# Patient Record
Sex: FEMALE | Race: WHITE | NOT HISPANIC OR LATINO | URBAN - METROPOLITAN AREA
[De-identification: names, ages, dates, MRNs, and addresses within clinical notes are randomized per-mention and may not be internally consistent; named-entity substitution may affect disease eponyms.]

---

## 2022-05-24 ENCOUNTER — OFFICE VISIT (OUTPATIENT)
Dept: OBSTETRICS AND GYNECOLOGY | Facility: CLINIC | Age: 22
End: 2022-05-24
Payer: COMMERCIAL

## 2022-05-24 VITALS
HEIGHT: 67 IN | DIASTOLIC BLOOD PRESSURE: 70 MMHG | SYSTOLIC BLOOD PRESSURE: 114 MMHG | BODY MASS INDEX: 26.44 KG/M2 | WEIGHT: 168.44 LBS

## 2022-05-24 DIAGNOSIS — Z01.419 ROUTINE GYNECOLOGICAL EXAMINATION: Primary | ICD-10-CM

## 2022-05-24 DIAGNOSIS — N93.0 PCB (POST COITAL BLEEDING): ICD-10-CM

## 2022-05-24 PROCEDURE — 1159F MED LIST DOCD IN RCRD: CPT | Mod: CPTII,S$GLB,, | Performed by: OBSTETRICS & GYNECOLOGY

## 2022-05-24 PROCEDURE — 3074F SYST BP LT 130 MM HG: CPT | Mod: CPTII,S$GLB,, | Performed by: OBSTETRICS & GYNECOLOGY

## 2022-05-24 PROCEDURE — 3008F BODY MASS INDEX DOCD: CPT | Mod: CPTII,S$GLB,, | Performed by: OBSTETRICS & GYNECOLOGY

## 2022-05-24 PROCEDURE — 3008F PR BODY MASS INDEX (BMI) DOCUMENTED: ICD-10-PCS | Mod: CPTII,S$GLB,, | Performed by: OBSTETRICS & GYNECOLOGY

## 2022-05-24 PROCEDURE — 1159F PR MEDICATION LIST DOCUMENTED IN MEDICAL RECORD: ICD-10-PCS | Mod: CPTII,S$GLB,, | Performed by: OBSTETRICS & GYNECOLOGY

## 2022-05-24 PROCEDURE — 88175 CYTOPATH C/V AUTO FLUID REDO: CPT | Performed by: OBSTETRICS & GYNECOLOGY

## 2022-05-24 PROCEDURE — 99999 PR PBB SHADOW E&M-NEW PATIENT-LVL III: CPT | Mod: PBBFAC,,, | Performed by: OBSTETRICS & GYNECOLOGY

## 2022-05-24 PROCEDURE — 87491 CHLMYD TRACH DNA AMP PROBE: CPT | Mod: 59 | Performed by: OBSTETRICS & GYNECOLOGY

## 2022-05-24 PROCEDURE — 3078F PR MOST RECENT DIASTOLIC BLOOD PRESSURE < 80 MM HG: ICD-10-PCS | Mod: CPTII,S$GLB,, | Performed by: OBSTETRICS & GYNECOLOGY

## 2022-05-24 PROCEDURE — 87591 N.GONORRHOEAE DNA AMP PROB: CPT | Mod: 59 | Performed by: OBSTETRICS & GYNECOLOGY

## 2022-05-24 PROCEDURE — 87481 CANDIDA DNA AMP PROBE: CPT | Mod: 59 | Performed by: OBSTETRICS & GYNECOLOGY

## 2022-05-24 PROCEDURE — 99999 PR PBB SHADOW E&M-NEW PATIENT-LVL III: ICD-10-PCS | Mod: PBBFAC,,, | Performed by: OBSTETRICS & GYNECOLOGY

## 2022-05-24 PROCEDURE — 3078F DIAST BP <80 MM HG: CPT | Mod: CPTII,S$GLB,, | Performed by: OBSTETRICS & GYNECOLOGY

## 2022-05-24 PROCEDURE — 99385 PR PREVENTIVE VISIT,NEW,18-39: ICD-10-PCS | Mod: S$GLB,,, | Performed by: OBSTETRICS & GYNECOLOGY

## 2022-05-24 PROCEDURE — 3074F PR MOST RECENT SYSTOLIC BLOOD PRESSURE < 130 MM HG: ICD-10-PCS | Mod: CPTII,S$GLB,, | Performed by: OBSTETRICS & GYNECOLOGY

## 2022-05-24 PROCEDURE — 87801 DETECT AGNT MULT DNA AMPLI: CPT | Performed by: OBSTETRICS & GYNECOLOGY

## 2022-05-24 PROCEDURE — 99385 PREV VISIT NEW AGE 18-39: CPT | Mod: S$GLB,,, | Performed by: OBSTETRICS & GYNECOLOGY

## 2022-05-25 LAB
C TRACH DNA SPEC QL NAA+PROBE: NOT DETECTED
N GONORRHOEA DNA SPEC QL NAA+PROBE: NOT DETECTED

## 2022-05-26 LAB
BACTERIAL VAGINOSIS DNA: POSITIVE
CANDIDA GLABRATA DNA: NEGATIVE
CANDIDA KRUSEI DNA: NEGATIVE
CANDIDA RRNA VAG QL PROBE: NEGATIVE
T VAGINALIS RRNA GENITAL QL PROBE: NEGATIVE

## 2022-05-26 RX ORDER — TINIDAZOLE 500 MG/1
TABLET ORAL
Qty: 8 TABLET | Refills: 1 | Status: SHIPPED | OUTPATIENT
Start: 2022-05-26 | End: 2022-07-29 | Stop reason: ALTCHOICE

## 2022-05-27 LAB
FINAL PATHOLOGIC DIAGNOSIS: NORMAL
Lab: NORMAL

## 2022-06-18 ENCOUNTER — OFFICE VISIT (OUTPATIENT)
Dept: URGENT CARE | Facility: CLINIC | Age: 22
End: 2022-06-18
Payer: COMMERCIAL

## 2022-06-18 VITALS
RESPIRATION RATE: 20 BRPM | TEMPERATURE: 98 F | HEART RATE: 78 BPM | WEIGHT: 168 LBS | BODY MASS INDEX: 26.37 KG/M2 | SYSTOLIC BLOOD PRESSURE: 144 MMHG | OXYGEN SATURATION: 98 % | DIASTOLIC BLOOD PRESSURE: 92 MMHG | HEIGHT: 67 IN

## 2022-06-18 DIAGNOSIS — T14.8XXA MUSCLE STRAIN: ICD-10-CM

## 2022-06-18 DIAGNOSIS — M54.9 ACUTE MIDLINE BACK PAIN, UNSPECIFIED BACK LOCATION: Primary | ICD-10-CM

## 2022-06-18 PROCEDURE — 99204 OFFICE O/P NEW MOD 45 MIN: CPT | Mod: S$GLB,,,

## 2022-06-18 PROCEDURE — 1159F PR MEDICATION LIST DOCUMENTED IN MEDICAL RECORD: ICD-10-PCS | Mod: CPTII,S$GLB,,

## 2022-06-18 PROCEDURE — 3080F DIAST BP >= 90 MM HG: CPT | Mod: CPTII,S$GLB,,

## 2022-06-18 PROCEDURE — 1160F PR REVIEW ALL MEDS BY PRESCRIBER/CLIN PHARMACIST DOCUMENTED: ICD-10-PCS | Mod: CPTII,S$GLB,,

## 2022-06-18 PROCEDURE — 3077F PR MOST RECENT SYSTOLIC BLOOD PRESSURE >= 140 MM HG: ICD-10-PCS | Mod: CPTII,S$GLB,,

## 2022-06-18 PROCEDURE — 72070 X-RAY EXAM THORAC SPINE 2VWS: CPT | Mod: FY,S$GLB,, | Performed by: RADIOLOGY

## 2022-06-18 PROCEDURE — 72070 XR THORACIC SPINE AP LATERAL: ICD-10-PCS | Mod: FY,S$GLB,, | Performed by: RADIOLOGY

## 2022-06-18 PROCEDURE — 3008F BODY MASS INDEX DOCD: CPT | Mod: CPTII,S$GLB,,

## 2022-06-18 PROCEDURE — 1160F RVW MEDS BY RX/DR IN RCRD: CPT | Mod: CPTII,S$GLB,,

## 2022-06-18 PROCEDURE — 99204 PR OFFICE/OUTPT VISIT, NEW, LEVL IV, 45-59 MIN: ICD-10-PCS | Mod: S$GLB,,,

## 2022-06-18 PROCEDURE — 3077F SYST BP >= 140 MM HG: CPT | Mod: CPTII,S$GLB,,

## 2022-06-18 PROCEDURE — 3008F PR BODY MASS INDEX (BMI) DOCUMENTED: ICD-10-PCS | Mod: CPTII,S$GLB,,

## 2022-06-18 PROCEDURE — 72040 XR CERVICAL SPINE AP LATERAL: ICD-10-PCS | Mod: FY,S$GLB,, | Performed by: RADIOLOGY

## 2022-06-18 PROCEDURE — 1159F MED LIST DOCD IN RCRD: CPT | Mod: CPTII,S$GLB,,

## 2022-06-18 PROCEDURE — 72040 X-RAY EXAM NECK SPINE 2-3 VW: CPT | Mod: FY,S$GLB,, | Performed by: RADIOLOGY

## 2022-06-18 PROCEDURE — 3080F PR MOST RECENT DIASTOLIC BLOOD PRESSURE >= 90 MM HG: ICD-10-PCS | Mod: CPTII,S$GLB,,

## 2022-06-18 RX ORDER — BUPROPION HYDROCHLORIDE 300 MG/1
300 TABLET ORAL EVERY MORNING
COMMUNITY
Start: 2022-05-18

## 2022-06-18 RX ORDER — NAPROXEN 500 MG/1
500 TABLET ORAL 2 TIMES DAILY WITH MEALS
Qty: 20 TABLET | Refills: 0 | Status: SHIPPED | OUTPATIENT
Start: 2022-06-18 | End: 2022-06-23

## 2022-06-18 RX ORDER — TIZANIDINE 2 MG/1
TABLET ORAL
Qty: 12 TABLET | Refills: 0 | Status: SHIPPED | OUTPATIENT
Start: 2022-06-18 | End: 2022-07-29

## 2022-06-18 RX ORDER — DEXTROAMPHETAMINE SACCHARATE, AMPHETAMINE ASPARTATE MONOHYDRATE, DEXTROAMPHETAMINE SULFATE AND AMPHETAMINE SULFATE 7.5; 7.5; 7.5; 7.5 MG/1; MG/1; MG/1; MG/1
30 CAPSULE, EXTENDED RELEASE ORAL EVERY MORNING
COMMUNITY
Start: 2022-05-18

## 2022-06-18 NOTE — PATIENT INSTRUCTIONS
PLEASE READ ALL DISCHARGE INSTRUCTIONS        Back Pain Discharge Instructions    Alternate heat and ice for first 48 hours then  apply heat. You may do gently stretching if tolerable.    Moist warm compresss to area several times daily.  May use a heating pad on LOW to provide heat over a towel which was dampended with warm water. DO NOT FALL ASLEEP WITH HEATING PAD ON.  Do not stay in one position to long.  When sleeping on your back place a pillow under knees to reduce tension on back.  If sleeping on your side, place pillow between knees to keep spine in better alinement.  Wear supportive shoes such as tennis shoes for support of the lower back.  Take any medication as directed.    If you were not prescribed an anti-inflammatory medication, and if you do not have any history of stomach/intestinal ulcers, or kidney disease, or are not taking a blood thinner such as Coumadin, Plavix, Pradaxa, Eloquis, or Xaralta for example, it is OK to take over the counter Ibuprofen or Advil or Motrin or Aleve as directed.  Do not take these medications on an empty stomach.      [facility] Ref[facility] to [facility]   [facility].    Please call [phone] to schedule your appointment.    Please go to the Emergency Department for any concerns or worsening of condition.  Please follow up with your primary care doctor or specialist in the next 48-72hrs as needed.    If you  smoke, please stop smoking.      If you were prescribed a narcotic medication, do not drive or operate heavy equipment or machinery while taking these medications.    If you lose control of your bowel and/or bladder; lose sensation in between your legs by your genitalia and/or rectum or  lose control or sensation of any extremity, please go to the nearest Emergency Department immediately.    Zaniflex Warning:   The medication you have been prescribed may cause drowsiness and impair your judgement.   Therefore, you should avoid driving, climbing, using machinery, etc., so as not to increase your risk of injury.  Do NOT drink any alcohol while on this medication(s).

## 2022-06-18 NOTE — PROGRESS NOTES
"Subjective:       Patient ID: Rajeev Rodgers is a 21 y.o. female.    Vitals:  height is 5' 7" (1.702 m) and weight is 76.2 kg (168 lb). Her temperature is 98.3 °F (36.8 °C). Her blood pressure is 144/92 (abnormal) and her pulse is 78. Her respiration is 20 and oxygen saturation is 98%.     Chief Complaint: Back Pain (Entered by patient)    Pt presents with complaint of upper back pain x3-4 days.  Pt states her pain is right below her shoulder blade on her left side.  Pt states her pain was 10 out of 10 last night when laying down.  Pt states she is not having trouble breathing but feels discomfort on deep breaths.  Pt states she has tingling going down her left side to her hand.  Pt sttaes she took ibuprofen yesterday for her pain with no relief    Provider note starts below:  Patient presents with chief complaint of back pain for 3-4 days.  She states she is not sure  What brought it on.  She does endorse a history of back pain.  She states that she normally works out and tries different things at the gym.  Last time she was at the gym she did a leg day which included dead lifting and lunges.  She reports the pain was 10/10 last night and started to have tingling in her left arm.  She states the pain is worse when she is resting or lying down.   She also reports feeling the pain when she takes a deep breath. She took couple ibuprofen as yesterday morning and at night but this did not relieve her symptoms.  She denies any numbness, tingling, weakness, fever, chills, cough, chest pain.   She denies any known trauma.    Back Pain  This is a new problem. The current episode started in the past 7 days. The problem occurs constantly. The problem has been gradually worsening since onset. The pain is present in the costovertebral angle. The quality of the pain is described as shooting and aching. The pain does not radiate. The pain is at a severity of 8/10. The pain is severe. The pain is worse during the night. The symptoms " are aggravated by lying down, position and coughing. Stiffness is present all day. Pertinent negatives include no bowel incontinence, chest pain, fever, numbness or weakness. She has tried NSAIDs for the symptoms. The treatment provided no relief.       Constitution: Negative for chills and fever.   Cardiovascular: Negative for chest pain and leg swelling.   Respiratory: Negative for chest tightness, cough, shortness of breath and wheezing.    Gastrointestinal: Negative for bowel incontinence.   Musculoskeletal: Positive for back pain and muscle ache. Negative for pain, trauma, joint pain, joint swelling and abnormal ROM of joint.   Neurological: Positive for tingling. Negative for numbness.       Objective:      Physical Exam   Constitutional: She is oriented to person, place, and time. She appears well-developed. She is cooperative. No distress.   HENT:   Head: Normocephalic and atraumatic.   Nose: Nose normal.   Eyes: Conjunctivae and lids are normal.   Neck: Phonation normal. Neck supple. No edema present. No decreased range of motion present. pain with movement present.   Cardiovascular: Normal rate, regular rhythm, S1 normal, S2 normal, normal heart sounds and normal pulses.   Pulmonary/Chest: Effort normal and breath sounds normal. No tachypnea. She has no decreased breath sounds. She has no wheezes. She has no rhonchi. She has no rales.   Abdominal: Normal appearance.   Musculoskeletal:         General: No deformity.      Cervical back: She exhibits tenderness and bony tenderness. She exhibits no swelling.      Thoracic back: She exhibits tenderness and bony tenderness. She exhibits normal range of motion, no swelling and no deformity.      Lumbar back: She exhibits normal range of motion, no tenderness and no bony tenderness.      Comments: Bony tenderness and paraspinal tenderness from C7 to approximately T12. Pain > in thoracic region; no scapular tenderness; no shoulder joint tenderness; full ROM of  shoulder joint and full flexion/extension of spine; 5/5 strength in BLE; full sensation   Neurological: no focal deficit. She is alert and oriented to person, place, and time. She has normal sensation, normal strength and normal reflexes. No sensory deficit.   Skin: Skin is warm, dry, intact, not diaphoretic and no rash.         Comments: No rash   Psychiatric: Her speech is normal and behavior is normal. Judgment and thought content normal.   Nursing note and vitals reviewed.      X-Ray Cervical Spine AP And Lateral    Result Date: 6/18/2022  EXAMINATION: XR CERVICAL SPINE AP LATERAL CLINICAL HISTORY: Dorsalgia, unspecified TECHNIQUE: AP, lateral and open mouth views of the cervical spine were performed. COMPARISON: None. FINDINGS: Straightening of the normal cervical lordosis.  Vertebral body heights are maintained.  Disc spaces are within normal limits.  Prevertebral soft tissues appear normal.  No fracture or subluxation.     As above. Electronically signed by: Belinda Bautista MD Date:    06/18/2022 Time:    10:46    X-Ray Thoracic Spine AP Lateral    Result Date: 6/18/2022  EXAMINATION: XR THORACIC SPINE AP LATERAL CLINICAL HISTORY: Dorsalgia, unspecified TECHNIQUE: AP and lateral views of the thoracic spine were performed. COMPARISON: None FINDINGS: Minimal dextroscoliotic curvature of the midthoracic spine.  Vertebral body heights are maintained.  Disc spaces are within normal limits.  No fracture or subluxation is seen.     As above. Electronically signed by: Belinda Bautista MD Date:    06/18/2022 Time:    10:45    Assessment:       1. Acute midline back pain, unspecified back location    2. Muscle strain          Plan:     imaging reviewed. Reviewed DDx with patient including muscle strain, fracture, and less likely, pneumonia. Unlikely to be pulmonary related based on physical exam and VSS. Low suspicion for cardiac etiology given lack of risk factors and history not overtly suspicious for cardiac origin.  Discussed treatment for muscle strain as below. Gave ED precautions for any new or worsening symptoms. Will refer to PT for further management as needed. Patient agreed with plan. All questions answered. Patient discharged in NAD.    Acute midline back pain, unspecified back location  -     X-Ray Thoracic Spine AP Lateral; Future; Expected date: 06/18/2022  -     X-Ray Cervical Spine AP And Lateral; Future; Expected date: 06/18/2022  -     naproxen (NAPROSYN) 500 MG tablet; Take 1 tablet (500 mg total) by mouth 2 (two) times daily with meals. for 5 days  Dispense: 20 tablet; Refill: 0  -     Ambulatory referral/consult to Physical/Occupational Therapy    Muscle strain  -     naproxen (NAPROSYN) 500 MG tablet; Take 1 tablet (500 mg total) by mouth 2 (two) times daily with meals. for 5 days  Dispense: 20 tablet; Refill: 0  -     tiZANidine (ZANAFLEX) 2 MG tablet; Take 1-2 tablets every 12 hours  Dispense: 12 tablet; Refill: 0      Patient Instructions   PLEASE READ ALL DISCHARGE INSTRUCTIONS        Back Pain Discharge Instructions    Alternate heat and ice for first 48 hours then  apply heat. You may do gently stretching if tolerable.    Moist warm compresss to area several times daily.  May use a heating pad on LOW to provide heat over a towel which was dampended with warm water. DO NOT FALL ASLEEP WITH HEATING PAD ON.  Do not stay in one position to long.  When sleeping on your back place a pillow under knees to reduce tension on back.  If sleeping on your side, place pillow between knees to keep spine in better alinement.  Wear supportive shoes such as tennis shoes for support of the lower back.  Take any medication as directed.    If you were not prescribed an anti-inflammatory medication, and if you do not have any history of stomach/intestinal ulcers, or kidney disease, or are not taking a blood thinner such as Coumadin, Plavix, Pradaxa, Eloquis, or Xaralta for example, it is OK to take over the counter  Ibuprofen or Advil or Motrin or Aleve as directed.  Do not take these medications on an empty stomach.      [facility] to [facility]   [facility].    Please call [phone] to schedule your appointment.    Please go to the Emergency Department for any concerns or worsening of condition.  Please follow up with your primary care doctor or specialist in the next 48-72hrs as needed.    If you  smoke, please stop smoking.      If you were prescribed a narcotic medication, do not drive or operate heavy equipment or machinery while taking these medications.    If you lose control of your bowel and/or bladder; lose sensation in between your legs by your genitalia and/or rectum or  lose control or sensation of any extremity, please go to the nearest Emergency Department immediately.    Zaniflex Warning:   The medication you have been prescribed may cause drowsiness and impair your judgement.  Therefore, you should avoid driving, climbing, using machinery, etc., so as not to increase your risk of injury.  Do NOT drink any alcohol while on this medication(s).

## 2022-06-24 ENCOUNTER — OFFICE VISIT (OUTPATIENT)
Dept: URGENT CARE | Facility: CLINIC | Age: 22
End: 2022-06-24
Payer: COMMERCIAL

## 2022-06-24 VITALS
BODY MASS INDEX: 26.37 KG/M2 | SYSTOLIC BLOOD PRESSURE: 138 MMHG | OXYGEN SATURATION: 98 % | RESPIRATION RATE: 16 BRPM | HEIGHT: 67 IN | TEMPERATURE: 98 F | HEART RATE: 79 BPM | WEIGHT: 168 LBS | DIASTOLIC BLOOD PRESSURE: 93 MMHG

## 2022-06-24 DIAGNOSIS — J02.9 SORE THROAT: ICD-10-CM

## 2022-06-24 DIAGNOSIS — B34.9 ACUTE VIRAL SYNDROME: Primary | ICD-10-CM

## 2022-06-24 DIAGNOSIS — R11.2 NON-INTRACTABLE VOMITING WITH NAUSEA, UNSPECIFIED VOMITING TYPE: ICD-10-CM

## 2022-06-24 LAB
CTP QC/QA: YES
CTP QC/QA: YES
MOLECULAR STREP A: NEGATIVE
SARS-COV-2 RDRP RESP QL NAA+PROBE: NEGATIVE

## 2022-06-24 PROCEDURE — 3075F PR MOST RECENT SYSTOLIC BLOOD PRESS GE 130-139MM HG: ICD-10-PCS | Mod: CPTII,S$GLB,, | Performed by: NURSE PRACTITIONER

## 2022-06-24 PROCEDURE — 1160F PR REVIEW ALL MEDS BY PRESCRIBER/CLIN PHARMACIST DOCUMENTED: ICD-10-PCS | Mod: CPTII,S$GLB,, | Performed by: NURSE PRACTITIONER

## 2022-06-24 PROCEDURE — 99213 PR OFFICE/OUTPT VISIT, EST, LEVL III, 20-29 MIN: ICD-10-PCS | Mod: S$GLB,,, | Performed by: NURSE PRACTITIONER

## 2022-06-24 PROCEDURE — U0002: ICD-10-PCS | Mod: QW,S$GLB,, | Performed by: NURSE PRACTITIONER

## 2022-06-24 PROCEDURE — 3008F BODY MASS INDEX DOCD: CPT | Mod: CPTII,S$GLB,, | Performed by: NURSE PRACTITIONER

## 2022-06-24 PROCEDURE — 87651 POCT STREP A MOLECULAR: ICD-10-PCS | Mod: QW,S$GLB,, | Performed by: NURSE PRACTITIONER

## 2022-06-24 PROCEDURE — 99213 OFFICE O/P EST LOW 20 MIN: CPT | Mod: S$GLB,,, | Performed by: NURSE PRACTITIONER

## 2022-06-24 PROCEDURE — U0002 COVID-19 LAB TEST NON-CDC: HCPCS | Mod: QW,S$GLB,, | Performed by: NURSE PRACTITIONER

## 2022-06-24 PROCEDURE — 87651 STREP A DNA AMP PROBE: CPT | Mod: QW,S$GLB,, | Performed by: NURSE PRACTITIONER

## 2022-06-24 PROCEDURE — 1159F PR MEDICATION LIST DOCUMENTED IN MEDICAL RECORD: ICD-10-PCS | Mod: CPTII,S$GLB,, | Performed by: NURSE PRACTITIONER

## 2022-06-24 PROCEDURE — 3080F PR MOST RECENT DIASTOLIC BLOOD PRESSURE >= 90 MM HG: ICD-10-PCS | Mod: CPTII,S$GLB,, | Performed by: NURSE PRACTITIONER

## 2022-06-24 PROCEDURE — 3075F SYST BP GE 130 - 139MM HG: CPT | Mod: CPTII,S$GLB,, | Performed by: NURSE PRACTITIONER

## 2022-06-24 PROCEDURE — 3080F DIAST BP >= 90 MM HG: CPT | Mod: CPTII,S$GLB,, | Performed by: NURSE PRACTITIONER

## 2022-06-24 PROCEDURE — 1159F MED LIST DOCD IN RCRD: CPT | Mod: CPTII,S$GLB,, | Performed by: NURSE PRACTITIONER

## 2022-06-24 PROCEDURE — 1160F RVW MEDS BY RX/DR IN RCRD: CPT | Mod: CPTII,S$GLB,, | Performed by: NURSE PRACTITIONER

## 2022-06-24 PROCEDURE — 3008F PR BODY MASS INDEX (BMI) DOCUMENTED: ICD-10-PCS | Mod: CPTII,S$GLB,, | Performed by: NURSE PRACTITIONER

## 2022-06-24 RX ORDER — ONDANSETRON 4 MG/1
TABLET, ORALLY DISINTEGRATING ORAL
Qty: 10 TABLET | Refills: 0 | Status: SHIPPED | OUTPATIENT
Start: 2022-06-24 | End: 2024-01-22

## 2022-06-24 NOTE — PATIENT INSTRUCTIONS
If your condition worsens or fails to improve we recommend that you receive another evaluation at the ER immediately or contact your PCP to discuss your concerns or return here. You must understand that you've received an urgent care treatment only and that you may be released before all your medical problems are known or treated. You the patient will arrange for followup care as instructed.   If the strept culture was done and returns negative in 3-5 days and you are still having a sore throat, you may need to get a mono spot test done or repeated.   Tylenol or ibuprofen for pain may help as long as you are not allergic to these meds or have a medical condition such as stomach ulcers, liver or kidney disease or taking blood thinners etc that would   prevent you from using these medications.   Rest and fluids will help as well.

## 2022-06-24 NOTE — PROGRESS NOTES
"Subjective:       Patient ID: Rajeev Rodgers is a 21 y.o. female.    Vitals:  height is 5' 7" (1.702 m) and weight is 76.2 kg (168 lb). Her temporal temperature is 98 °F (36.7 °C). Her blood pressure is 138/93 (abnormal) and her pulse is 79. Her respiration is 16 and oxygen saturation is 98%.     Chief Complaint: Sore Throat    Patient presents with c/o nausea vomiting diarrhea on Tuesday.  These symptoms have resolved.  Now with fatigue with a scratchy throat starting yesterday.  Denies fever.  Denies cough, congestion.      Sore Throat   This is a new problem. The current episode started in the past 7 days. The problem has been gradually worsening. There has been no fever. The pain is at a severity of 3/10. The pain is mild. Associated symptoms include abdominal pain (generalized soreness), diarrhea and vomiting. Pertinent negatives include no congestion, coughing, drooling, ear discharge, ear pain, headaches, hoarse voice, plugged ear sensation, neck pain, shortness of breath, stridor, swollen glands or trouble swallowing. She has had exposure to strep. She has had no exposure to mono. Exposure to: possible. She has tried nothing for the symptoms.       Constitution: Positive for fatigue. Negative for chills, sweating and fever.   HENT: Positive for sore throat. Negative for ear pain, ear discharge, drooling, congestion and trouble swallowing.    Neck: Negative for neck pain.   Respiratory: Negative for cough, shortness of breath and stridor.    Gastrointestinal: Positive for abdominal pain (generalized soreness), vomiting and diarrhea. Negative for abdominal bloating.   Neurological: Negative for headaches.       Objective:      Physical Exam   Constitutional: She is oriented to person, place, and time. She appears well-developed. She is cooperative.  Non-toxic appearance. She does not appear ill. No distress.   HENT:   Head: Normocephalic and atraumatic.   Ears:   Right Ear: Hearing, tympanic membrane, external " ear and ear canal normal.   Left Ear: Hearing, tympanic membrane, external ear and ear canal normal.   Nose: Nose normal. No mucosal edema, rhinorrhea or nasal deformity. No epistaxis. Right sinus exhibits no maxillary sinus tenderness and no frontal sinus tenderness. Left sinus exhibits no maxillary sinus tenderness and no frontal sinus tenderness.   Mouth/Throat: Uvula is midline, oropharynx is clear and moist and mucous membranes are normal. No trismus in the jaw. Normal dentition. No uvula swelling. No oropharyngeal exudate, posterior oropharyngeal edema or posterior oropharyngeal erythema.   Eyes: Conjunctivae and lids are normal. No scleral icterus.   Neck: Trachea normal and phonation normal. Neck supple. No edema present. No erythema present. No neck rigidity present.   Cardiovascular: Normal rate, regular rhythm, normal heart sounds and normal pulses.   Pulmonary/Chest: Effort normal and breath sounds normal. No respiratory distress. She has no decreased breath sounds. She has no rhonchi.   Abdominal: Normal appearance and bowel sounds are normal. She exhibits no distension, no abdominal bruit, no pulsatile midline mass and no mass. Soft. There is no abdominal tenderness. There is no rebound, no guarding, no left CVA tenderness and no right CVA tenderness.   Musculoskeletal: Normal range of motion.         General: No deformity. Normal range of motion.   Neurological: She is alert and oriented to person, place, and time. She has normal strength. She exhibits normal muscle tone. Coordination normal.   Skin: Skin is warm, dry, intact, not diaphoretic and not pale.   Psychiatric: Her speech is normal and behavior is normal. Judgment and thought content normal.   Nursing note and vitals reviewed.    Results for orders placed or performed in visit on 06/24/22   POCT Strep A, Molecular   Result Value Ref Range    Molecular Strep A, POC Negative Negative     Acceptable Yes    POCT COVID-19 Rapid  Screening   Result Value Ref Range    POC Rapid COVID Negative Negative     Acceptable Yes          Assessment:       1. Acute viral syndrome    2. Sore throat    3. Non-intractable vomiting with nausea, unspecified vomiting type          Plan:       Lab reviewed  Acute viral syndrome    Sore throat  -     POCT Strep A, Molecular  -     POCT COVID-19 Rapid Screening    Non-intractable vomiting with nausea, unspecified vomiting type  -     ondansetron (ZOFRAN-ODT) 4 MG TbDL; One tablet sublingual tid prn nausea  Dispense: 10 tablet; Refill: 0      Patient Instructions     If your condition worsens or fails to improve we recommend that you receive another evaluation at the ER immediately or contact your PCP to discuss your concerns or return here. You must understand that you've received an urgent care treatment only and that you may be released before all your medical problems are known or treated. You the patient will arrange for followup care as instructed.   If the strept culture was done and returns negative in 3-5 days and you are still having a sore throat, you may need to get a mono spot test done or repeated.   Tylenol or ibuprofen for pain may help as long as you are not allergic to these meds or have a medical condition such as stomach ulcers, liver or kidney disease or taking blood thinners etc that would   prevent you from using these medications.   Rest and fluids will help as well.

## 2022-07-29 ENCOUNTER — LAB VISIT (OUTPATIENT)
Dept: LAB | Facility: HOSPITAL | Age: 22
End: 2022-07-29
Attending: FAMILY MEDICINE
Payer: COMMERCIAL

## 2022-07-29 ENCOUNTER — OFFICE VISIT (OUTPATIENT)
Dept: PRIMARY CARE CLINIC | Facility: CLINIC | Age: 22
End: 2022-07-29
Payer: COMMERCIAL

## 2022-07-29 VITALS
OXYGEN SATURATION: 98 % | HEIGHT: 67 IN | SYSTOLIC BLOOD PRESSURE: 130 MMHG | DIASTOLIC BLOOD PRESSURE: 78 MMHG | HEART RATE: 99 BPM | WEIGHT: 160.94 LBS | BODY MASS INDEX: 25.26 KG/M2

## 2022-07-29 DIAGNOSIS — Z13.6 ENCOUNTER FOR LIPID SCREENING FOR CARDIOVASCULAR DISEASE: ICD-10-CM

## 2022-07-29 DIAGNOSIS — Z11.3 ROUTINE SCREENING FOR STI (SEXUALLY TRANSMITTED INFECTION): ICD-10-CM

## 2022-07-29 DIAGNOSIS — Z13.220 ENCOUNTER FOR LIPID SCREENING FOR CARDIOVASCULAR DISEASE: ICD-10-CM

## 2022-07-29 DIAGNOSIS — F32.81 PMDD (PREMENSTRUAL DYSPHORIC DISORDER): ICD-10-CM

## 2022-07-29 DIAGNOSIS — Z00.00 GENERAL MEDICAL EXAM: Primary | ICD-10-CM

## 2022-07-29 DIAGNOSIS — Z11.4 ENCOUNTER FOR SCREENING FOR HIV: ICD-10-CM

## 2022-07-29 DIAGNOSIS — Z11.59 NEED FOR HEPATITIS C SCREENING TEST: ICD-10-CM

## 2022-07-29 DIAGNOSIS — Z00.00 GENERAL MEDICAL EXAM: ICD-10-CM

## 2022-07-29 LAB
ALBUMIN SERPL BCP-MCNC: 4.9 G/DL (ref 3.5–5.2)
ALP SERPL-CCNC: 62 U/L (ref 55–135)
ALT SERPL W/O P-5'-P-CCNC: 18 U/L (ref 10–44)
ANION GAP SERPL CALC-SCNC: 9 MMOL/L (ref 8–16)
AST SERPL-CCNC: 17 U/L (ref 10–40)
BASOPHILS # BLD AUTO: 0.02 K/UL (ref 0–0.2)
BASOPHILS NFR BLD: 0.6 % (ref 0–1.9)
BILIRUB SERPL-MCNC: 0.6 MG/DL (ref 0.1–1)
BUN SERPL-MCNC: 10 MG/DL (ref 6–20)
CALCIUM SERPL-MCNC: 10.6 MG/DL (ref 8.7–10.5)
CHLORIDE SERPL-SCNC: 102 MMOL/L (ref 95–110)
CHOLEST SERPL-MCNC: 148 MG/DL (ref 120–199)
CHOLEST/HDLC SERPL: 3.1 {RATIO} (ref 2–5)
CO2 SERPL-SCNC: 27 MMOL/L (ref 23–29)
CREAT SERPL-MCNC: 0.8 MG/DL (ref 0.5–1.4)
DIFFERENTIAL METHOD: ABNORMAL
EOSINOPHIL # BLD AUTO: 0.2 K/UL (ref 0–0.5)
EOSINOPHIL NFR BLD: 6.1 % (ref 0–8)
ERYTHROCYTE [DISTWIDTH] IN BLOOD BY AUTOMATED COUNT: 11.8 % (ref 11.5–14.5)
EST. GFR  (AFRICAN AMERICAN): >60 ML/MIN/1.73 M^2
EST. GFR  (NON AFRICAN AMERICAN): >60 ML/MIN/1.73 M^2
GLUCOSE SERPL-MCNC: 84 MG/DL (ref 70–110)
HCT VFR BLD AUTO: 44.3 % (ref 37–48.5)
HDLC SERPL-MCNC: 48 MG/DL (ref 40–75)
HDLC SERPL: 32.4 % (ref 20–50)
HGB BLD-MCNC: 15.2 G/DL (ref 12–16)
IMM GRANULOCYTES # BLD AUTO: 0 K/UL (ref 0–0.04)
IMM GRANULOCYTES NFR BLD AUTO: 0 % (ref 0–0.5)
LDLC SERPL CALC-MCNC: 87.8 MG/DL (ref 63–159)
LYMPHOCYTES # BLD AUTO: 1.7 K/UL (ref 1–4.8)
LYMPHOCYTES NFR BLD: 46.9 % (ref 18–48)
MCH RBC QN AUTO: 30.9 PG (ref 27–31)
MCHC RBC AUTO-ENTMCNC: 34.3 G/DL (ref 32–36)
MCV RBC AUTO: 90 FL (ref 82–98)
MONOCYTES # BLD AUTO: 0.3 K/UL (ref 0.3–1)
MONOCYTES NFR BLD: 7.8 % (ref 4–15)
NEUTROPHILS # BLD AUTO: 1.4 K/UL (ref 1.8–7.7)
NEUTROPHILS NFR BLD: 38.6 % (ref 38–73)
NONHDLC SERPL-MCNC: 100 MG/DL
NRBC BLD-RTO: 0 /100 WBC
PLATELET # BLD AUTO: 318 K/UL (ref 150–450)
PMV BLD AUTO: 10.7 FL (ref 9.2–12.9)
POTASSIUM SERPL-SCNC: 4.1 MMOL/L (ref 3.5–5.1)
PROT SERPL-MCNC: 7.8 G/DL (ref 6–8.4)
RBC # BLD AUTO: 4.92 M/UL (ref 4–5.4)
SODIUM SERPL-SCNC: 138 MMOL/L (ref 136–145)
TRIGL SERPL-MCNC: 61 MG/DL (ref 30–150)
TSH SERPL DL<=0.005 MIU/L-ACNC: 0.87 UIU/ML (ref 0.4–4)
WBC # BLD AUTO: 3.6 K/UL (ref 3.9–12.7)

## 2022-07-29 PROCEDURE — 99385 PREV VISIT NEW AGE 18-39: CPT | Mod: 25,1D,GY,S$GLB | Performed by: FAMILY MEDICINE

## 2022-07-29 PROCEDURE — 3078F PR MOST RECENT DIASTOLIC BLOOD PRESSURE < 80 MM HG: ICD-10-PCS | Mod: CPTII,S$GLB,, | Performed by: FAMILY MEDICINE

## 2022-07-29 PROCEDURE — 87389 HIV-1 AG W/HIV-1&-2 AB AG IA: CPT | Performed by: FAMILY MEDICINE

## 2022-07-29 PROCEDURE — 1160F PR REVIEW ALL MEDS BY PRESCRIBER/CLIN PHARMACIST DOCUMENTED: ICD-10-PCS | Mod: CPTII,S$GLB,, | Performed by: FAMILY MEDICINE

## 2022-07-29 PROCEDURE — 80053 COMPREHEN METABOLIC PANEL: CPT | Performed by: FAMILY MEDICINE

## 2022-07-29 PROCEDURE — 36415 COLL VENOUS BLD VENIPUNCTURE: CPT | Mod: PN | Performed by: FAMILY MEDICINE

## 2022-07-29 PROCEDURE — 86592 SYPHILIS TEST NON-TREP QUAL: CPT | Performed by: FAMILY MEDICINE

## 2022-07-29 PROCEDURE — 3008F PR BODY MASS INDEX (BMI) DOCUMENTED: ICD-10-PCS | Mod: CPTII,S$GLB,, | Performed by: FAMILY MEDICINE

## 2022-07-29 PROCEDURE — 80061 LIPID PANEL: CPT | Performed by: FAMILY MEDICINE

## 2022-07-29 PROCEDURE — 3078F DIAST BP <80 MM HG: CPT | Mod: CPTII,S$GLB,, | Performed by: FAMILY MEDICINE

## 2022-07-29 PROCEDURE — 1159F MED LIST DOCD IN RCRD: CPT | Mod: CPTII,S$GLB,, | Performed by: FAMILY MEDICINE

## 2022-07-29 PROCEDURE — 86803 HEPATITIS C AB TEST: CPT | Performed by: FAMILY MEDICINE

## 2022-07-29 PROCEDURE — 1160F RVW MEDS BY RX/DR IN RCRD: CPT | Mod: CPTII,S$GLB,, | Performed by: FAMILY MEDICINE

## 2022-07-29 PROCEDURE — 99202 PR OFFICE/OUTPT VISIT, NEW, LEVL II, 15-29 MIN: ICD-10-PCS | Mod: 25,S$GLB,, | Performed by: FAMILY MEDICINE

## 2022-07-29 PROCEDURE — 3075F SYST BP GE 130 - 139MM HG: CPT | Mod: CPTII,S$GLB,, | Performed by: FAMILY MEDICINE

## 2022-07-29 PROCEDURE — 1159F PR MEDICATION LIST DOCUMENTED IN MEDICAL RECORD: ICD-10-PCS | Mod: CPTII,S$GLB,, | Performed by: FAMILY MEDICINE

## 2022-07-29 PROCEDURE — 85025 COMPLETE CBC W/AUTO DIFF WBC: CPT | Performed by: FAMILY MEDICINE

## 2022-07-29 PROCEDURE — 3075F PR MOST RECENT SYSTOLIC BLOOD PRESS GE 130-139MM HG: ICD-10-PCS | Mod: CPTII,S$GLB,, | Performed by: FAMILY MEDICINE

## 2022-07-29 PROCEDURE — 84443 ASSAY THYROID STIM HORMONE: CPT | Performed by: FAMILY MEDICINE

## 2022-07-29 PROCEDURE — 99202 OFFICE O/P NEW SF 15 MIN: CPT | Mod: 25,S$GLB,, | Performed by: FAMILY MEDICINE

## 2022-07-29 PROCEDURE — 99999 PR PBB SHADOW E&M-EST. PATIENT-LVL III: ICD-10-PCS | Mod: PBBFAC,,, | Performed by: FAMILY MEDICINE

## 2022-07-29 PROCEDURE — 3008F BODY MASS INDEX DOCD: CPT | Mod: CPTII,S$GLB,, | Performed by: FAMILY MEDICINE

## 2022-07-29 PROCEDURE — 99999 PR PBB SHADOW E&M-EST. PATIENT-LVL III: CPT | Mod: PBBFAC,,, | Performed by: FAMILY MEDICINE

## 2022-07-29 PROCEDURE — 99385 PR PREVENTIVE VISIT,NEW,18-39: ICD-10-PCS | Mod: 25,1D,GY,S$GLB | Performed by: FAMILY MEDICINE

## 2022-07-29 RX ORDER — DEXTROAMPHETAMINE SACCHARATE, AMPHETAMINE ASPARTATE MONOHYDRATE, DEXTROAMPHETAMINE SULFATE AND AMPHETAMINE SULFATE 5; 5; 5; 5 MG/1; MG/1; MG/1; MG/1
CAPSULE, EXTENDED RELEASE ORAL
COMMUNITY
Start: 2021-10-01 | End: 2022-07-29 | Stop reason: SDUPTHER

## 2022-07-29 RX ORDER — BUPROPION HYDROCHLORIDE 300 MG/1
TABLET ORAL
COMMUNITY
Start: 2021-11-15 | End: 2022-07-29 | Stop reason: SDUPTHER

## 2022-07-29 RX ORDER — MELOXICAM 15 MG/1
15 TABLET ORAL DAILY PRN
Qty: 30 TABLET | Refills: 2 | Status: SHIPPED | OUTPATIENT
Start: 2022-07-29 | End: 2024-01-22

## 2022-07-30 LAB — RPR SER QL: NORMAL

## 2022-07-31 NOTE — PROGRESS NOTES
Subjective:       Patient ID: Rajeev Rodgers is a 21 y.o. female.    Chief Complaint: Annual Exam    HPI   21 year old female comes in for annual exam. Reports history of depression and ADHD.    Review of Systems   Constitutional: Negative for unexpected weight change.   HENT: Negative for ear pain and sore throat.    Eyes: Negative for visual disturbance.   Respiratory: Negative for shortness of breath.    Cardiovascular: Negative for chest pain.   Gastrointestinal: Negative for abdominal pain and blood in stool.   Endocrine: Negative for cold intolerance and heat intolerance.   Genitourinary: Negative for dysuria and frequency.   Integumentary:  Negative for rash.   Neurological: Negative for weakness, numbness and headaches.   Hematological: Negative for adenopathy.         Objective:      Physical Exam  Vitals reviewed.   Constitutional:       General: She is not in acute distress.  HENT:      Head: Normocephalic and atraumatic.      Right Ear: Ear canal and external ear normal.      Left Ear: Ear canal and external ear normal.      Nose: Nose normal.      Mouth/Throat:      Mouth: Mucous membranes are moist.      Pharynx: No oropharyngeal exudate or posterior oropharyngeal erythema.   Eyes:      Extraocular Movements: Extraocular movements intact.      Conjunctiva/sclera: Conjunctivae normal.      Pupils: Pupils are equal, round, and reactive to light.   Cardiovascular:      Rate and Rhythm: Normal rate and regular rhythm.      Pulses: Normal pulses.      Heart sounds: Normal heart sounds.   Pulmonary:      Effort: Pulmonary effort is normal. No respiratory distress.      Breath sounds: No wheezing or rales.   Musculoskeletal:      Cervical back: Normal range of motion. No rigidity or tenderness.   Lymphadenopathy:      Cervical: No cervical adenopathy.   Skin:     General: Skin is warm.      Capillary Refill: Capillary refill takes less than 2 seconds.   Neurological:      Mental Status: She is alert and  oriented to person, place, and time.      Cranial Nerves: No cranial nerve deficit.      Sensory: No sensory deficit.         Assessment:       Problem List Items Addressed This Visit    None     Visit Diagnoses     General medical exam    -  Primary    Relevant Orders    Comprehensive Metabolic Panel (Completed)    CBC Auto Differential (Completed)    Lipid Panel (Completed)    Urinalysis (Completed)    Encounter for lipid screening for cardiovascular disease        Relevant Orders    Lipid Panel (Completed)    Encounter for screening for HIV        Relevant Orders    HIV 1/2 Ag/Ab (4th Gen)    Need for hepatitis C screening test        Relevant Orders    Hepatitis C Antibody    Routine screening for STI (sexually transmitted infection)        Relevant Orders    C. trachomatis/N. gonorrhoeae by AMP DNA [facility]; Urine    RPR (Completed)    PMDD (premenstrual dysphoric disorder)        Relevant Medications    meloxicam (MOBIC) 15 MG tablet    Other Relevant Orders    TSH (Completed)          Plan:       Rajeev was seen today for annual exam.    Diagnoses and all orders for this visit:    General medical exam  -     Comprehensive Metabolic Panel; Future  -     CBC Auto Differential; Future  -     Lipid Panel; Future  -     Urinalysis; Future  Age appropriate recommendations reviewed.  Screening labs ordered.    Encounter for lipid screening for cardiovascular disease  -     Lipid Panel; Future  Screening lipid panel ordered.    Encounter for screening for HIV  -     HIV 1/2 Ag/Ab (4th Gen); Future  Screen for HIV as per USPSTF guidelines    Need for hepatitis C screening test  -     Hepatitis C Antibody; Future  Screen for HCV as per USPSTF guidelines    Routine screening for STI (sexually transmitted infection)  -     C. trachomatis/N. gonorrhoeae by AMP DNA [facility]; Urine; Future  -     RPR; Future  Screen for STI          ____________________________________________________________    CC: Menstrual  problem    HPI  Patient reports that her periods are regular but does get severe cramping and she becomes extremely irritable, and depressed. She admits to thoughts of death at times. Has never formulated any plans to hurt herself. Symptoms last for over a week. Has an IUD.     Review of Systems   Genitourinary: Positive for menstrual problem. Negative for dysuria and frequency.   Psychiatric/Behavioral: Positive for agitation, decreased concentration, dysphoric mood and sleep disturbance.        Psychiatric symptoms with period      Physical Exam  Abdominal:      General: Abdomen is flat. Bowel sounds are normal. There is no distension.      Palpations: Abdomen is soft.      Tenderness: There is no abdominal tenderness. There is no guarding.   Psychiatric:         Mood and Affect: Mood normal.         Behavior: Behavior normal.     Assessment/Planb  PMDD (premenstrual dysphoric disorder)  -     TSH; Future  -     meloxicam (MOBIC) 15 MG tablet; Take 1 tablet (15 mg total) by mouth daily as needed (PMDD symptoms).  Discussed management options.  Will try Mobic for when starts having pain  If not helping, consider adding Zoloft/Celexa to use during luteal phase of period.

## 2022-08-01 LAB
HCV AB SERPL QL IA: NEGATIVE
HIV 1+2 AB+HIV1 P24 AG SERPL QL IA: NEGATIVE

## 2022-08-04 ENCOUNTER — OFFICE VISIT (OUTPATIENT)
Dept: PRIMARY CARE CLINIC | Facility: CLINIC | Age: 22
End: 2022-08-04
Payer: COMMERCIAL

## 2022-08-04 DIAGNOSIS — N76.0 VAGINOSIS: Primary | ICD-10-CM

## 2022-08-04 PROCEDURE — 99212 PR OFFICE/OUTPT VISIT, EST, LEVL II, 10-19 MIN: ICD-10-PCS | Mod: 95,,, | Performed by: NURSE PRACTITIONER

## 2022-08-04 PROCEDURE — 1159F PR MEDICATION LIST DOCUMENTED IN MEDICAL RECORD: ICD-10-PCS | Mod: CPTII,95,, | Performed by: NURSE PRACTITIONER

## 2022-08-04 PROCEDURE — 1160F RVW MEDS BY RX/DR IN RCRD: CPT | Mod: CPTII,95,, | Performed by: NURSE PRACTITIONER

## 2022-08-04 PROCEDURE — 99212 OFFICE O/P EST SF 10 MIN: CPT | Mod: 95,,, | Performed by: NURSE PRACTITIONER

## 2022-08-04 PROCEDURE — 1159F MED LIST DOCD IN RCRD: CPT | Mod: CPTII,95,, | Performed by: NURSE PRACTITIONER

## 2022-08-04 PROCEDURE — 1160F PR REVIEW ALL MEDS BY PRESCRIBER/CLIN PHARMACIST DOCUMENTED: ICD-10-PCS | Mod: CPTII,95,, | Performed by: NURSE PRACTITIONER

## 2022-08-04 RX ORDER — FLUCONAZOLE 150 MG/1
150 TABLET ORAL DAILY
Qty: 2 TABLET | Refills: 0 | Status: SHIPPED | OUTPATIENT
Start: 2022-08-04 | End: 2022-08-06

## 2022-08-04 NOTE — PROGRESS NOTES
[facility] Note    Chief Complaint    vaginosis    History of Present Illness      Rajeev Rodgers is a 21 y.o. female who presents today for vaginosis. Symptoms of vaginal discomfort started yesterday.  She denies any SOB, chest pain, N/V, unintentional weight loss, loss of appetite, fatigue, diarrhea, constipation. She is active daily and remains independent with ADL's.     Problem List Items Addressed This Visit    None     Visit Diagnoses     Vaginosis    -  Primary    Relevant Medications    fluconazole (DIFLUCAN) 150 MG Tab          Review of Systems   Constitutional: Negative.    HENT: Negative.    Eyes: Negative.    Respiratory: Negative.    Cardiovascular: Negative.    Gastrointestinal: Negative.    Musculoskeletal: Negative.    Skin: Negative.    Neurological: Negative.    Endo/Heme/Allergies: Negative.    Psychiatric/Behavioral: Negative.         Past Medical History:  Past Medical History:   Diagnosis Date    ADHD     Depression        Past Surgical History:  Past Surgical History:   Procedure Laterality Date    INTRAUTERINE DEVICE INSERTION  2020    KYLEENA       Family History:  family history includes Hypertension in her father.   Family history was reviewed with patient.    Social History:  Social History     Socioeconomic History    Marital status: Single   Tobacco Use    Smoking status: Never Smoker    Smokeless tobacco: Never Used   Substance and Sexual Activity    Alcohol use: Yes    Drug use: Not Currently    Sexual activity: Yes     Partners: Male         Medications:  Outpatient Encounter Medications as of 8/4/2022   Medication Sig Dispense Refill    buPROPion (WELLBUTRIN XL) 300 MG 24 hr tablet Take 300 mg by mouth every morning.      dextroamphetamine-amphetamine (ADDERALL XR) 30 MG 24 hr capsule Take 30 mg by mouth every morning.      fluconazole (DIFLUCAN) 150 MG Tab Take 1 tablet (150 mg total) by mouth once daily. for 2 doses 2 tablet 0    meloxicam (MOBIC) 15  MG tablet Take 1 tablet (15 mg total) by mouth daily as needed (PMDD symptoms). 30 tablet 2    ondansetron (ZOFRAN-ODT) 4 MG TbDL One tablet sublingual tid prn nausea 10 tablet 0     No facility-administered encounter medications on file as of 8/4/2022.       Allergies:  Review of patient's allergies indicates:  No Known Allergies    Health Maintenance:  Health Maintenance   Topic Date Due    HPV Vaccines (1 - 2-dose series) Never done    Chlamydia Screening  07/29/2023    Pap Smear  05/24/2025    TETANUS VACCINE  07/23/2031    Hepatitis C Screening  Completed    Lipid Panel  Completed     Health Maintenance Topics with due status: Not Due       Topic Last Completion Date    TETANUS VACCINE 07/23/2021    Pap Smear 05/24/2022    Chlamydia Screening 07/29/2022    Influenza Vaccine Not Due       Physical Exam       ]    Laboratory:  CBC:  Recent Labs   Lab 07/29/22  1016   WBC 3.60 L   RBC 4.92   Hemoglobin 15.2   Hematocrit 44.3   Platelets 318   MCV 90   MCH 30.9   MCHC 34.3     CMP:  Recent Labs   Lab 07/29/22  1016   Glucose 84   Calcium 10.6 H   Albumin 4.9   Total Protein 7.8   Sodium 138   Potassium 4.1   CO2 27   Chloride 102   BUN 10   Alkaline Phosphatase 62   ALT 18   AST 17   Total Bilirubin 0.6     URINALYSIS:  Recent Labs   Lab 07/29/22  1017   Color, UA Straw   Specific [place], UA 1.005   pH, UA 8.0   Protein, UA Negative   Nitrite, UA Negative   Leukocytes, UA Negative      LIPIDS:  Recent Labs   Lab 07/29/22  1016   TSH 0.866   HDL 48   Cholesterol 148   Triglycerides 61   LDL Cholesterol 87.8   HDL/Cholesterol Ratio 32.4   Non-HDL Cholesterol 100   Total Cholesterol/HDL Ratio 3.1     TSH:  Recent Labs   Lab 07/29/22  1016   TSH 0.866     A1C:        Radiology:        Assessment/Plan     Rajeev Rodgers is a 21 y.o.female with:    Vaginosis  -     fluconazole (DIFLUCAN) 150 MG Tab; Take 1 tablet (150 mg total) by mouth once daily. for 2 doses  Dispense: 2 tablet; Refill: 0    - Avoid bubble  baths  - Avoid sexual intercourse for 1 week  - Avoid vaginal douching.  - Wipe front to back after voiding  - Avoid tight clothing (pants, shorts, underwear) until vaginosis resolved  - Increase oral water intake    As above, continue current medications and maintain follow up with specialists.  Return to clinic as needed.    I spent 11 minutes on the day of this virtual encounter for preparing, evaluating, treating, and discussing plan of care with this patient.  Greater than 50% of this time was spent face to face via virtual visit with patient.  All questions were answered to patient's satisfaction.    Answers for HPI/ROS submitted by the patient on 8/4/2022  activity change: No  unexpected weight change: No  neck pain: No  hearing loss: No  rhinorrhea: No  trouble swallowing: No  eye discharge: No  visual disturbance: No  chest tightness: No  wheezing: No  chest pain: No  palpitations: No  blood in stool: No  constipation: No  vomiting: No  diarrhea: No  polydipsia: No  polyuria: No  difficulty urinating: No  hematuria: No  menstrual problem: Yes  dysuria: No  joint swelling: No  arthralgias: No  headaches: No  weakness: No  confusion: No  dysphoric mood: No        Karen L Spencer, NP-C [facility]

## 2022-08-08 NOTE — PROGRESS NOTES
[facility] Note    Chief Complaint    F/U visit    History of Present Illness      Rajeev Rodgers is a 21 y.o. female who presents today for f/u visit for possible UTI. She reports feeling some discomfort upon urination. She is concerned that a new medication, Meloxicam, prescribed for her PDD, has caused her to have a UTI. She thought this was prescribed to her to alleviate her depression during her menstrual cycle. She sees a psychiatrist for depression. She has an appointment next week to see her psychiatrist and will discuss further treatment for her heavier depression during her menstrual cycle. She denies suicide ideation.   She has an IUD and does not get  menstrual bleeding but states she gets all of the side effects of cramps, depression, bloating. Otherwise, patient is feeling well.   She denies any SOB, chest pain, N/V, unintentional weight loss, loss of appetite, fatigue, diarrhea, constipation. She is active daily and remains independent with ADL's.    Problem List Items Addressed This Visit        Psychiatric    Depression       Renal/    UTI (urinary tract infection) - Primary    Relevant Medications    nitrofurantoin, macrocrystal-monohydrate, (MACROBID) 100 MG capsule    Other Relevant Orders    Urinalysis, Reflex to Urine Culture Urine, Clean Catch          Review of Systems   Constitutional: Negative.    HENT: Negative.    Eyes: Negative.    Respiratory: Negative.    Cardiovascular: Negative.    Gastrointestinal: Negative.    Genitourinary: Positive for urgency.   Skin: Negative.    Neurological: Negative.    Endo/Heme/Allergies: Negative.    Psychiatric/Behavioral: Negative.         Past Medical History:  Past Medical History:   Diagnosis Date    ADHD     Depression        Past Surgical History:  Past Surgical History:   Procedure Laterality Date    INTRAUTERINE DEVICE INSERTION  2020    KYLEENA       Family History:  family history includes Hypertension in her father.  "  Family history was reviewed with patient.    Social History:  Social History     Socioeconomic History    Marital status: Single   Tobacco Use    Smoking status: Never Smoker    Smokeless tobacco: Never Used   Substance and Sexual Activity    Alcohol use: Yes    Drug use: Not Currently    Sexual activity: Yes     Partners: Male         Medications:  Outpatient Encounter Medications as of 8/12/2022   Medication Sig Dispense Refill    buPROPion (WELLBUTRIN XL) 300 MG 24 hr tablet Take 300 mg by mouth every morning.      dextroamphetamine-amphetamine (ADDERALL XR) 30 MG 24 hr capsule Take 30 mg by mouth every morning.      meloxicam (MOBIC) 15 MG tablet Take 1 tablet (15 mg total) by mouth daily as needed (PMDD symptoms). 30 tablet 2    ondansetron (ZOFRAN-ODT) 4 MG TbDL One tablet sublingual tid prn nausea 10 tablet 0    nitrofurantoin, macrocrystal-monohydrate, (MACROBID) 100 MG capsule Take 1 capsule (100 mg total) by mouth 2 (two) times daily. 14 capsule 0     No facility-administered encounter medications on file as of 8/12/2022.       Allergies:  Review of patient's allergies indicates:  No Known Allergies    Health Maintenance:  Health Maintenance   Topic Date Due    HPV Vaccines (1 - 2-dose series) Never done    Chlamydia Screening  07/29/2023    Pap Smear  05/24/2025    TETANUS VACCINE  07/23/2031    Hepatitis C Screening  Completed    Lipid Panel  Completed     Health Maintenance Topics with due status: Not Due       Topic Last Completion Date    TETANUS VACCINE 07/23/2021    Pap Smear 05/24/2022    Chlamydia Screening 07/29/2022    Influenza Vaccine Not Due       Physical Exam      Vital Signs  Temp: 98.7 °F (37.1 °C)  Pulse: 73  Resp: 18  SpO2: 99 %  BP: 126/82  Pain Score: 0-No pain  Height and Weight  Height: 5' 7" (170.2 cm)  Weight: 73.4 kg (161 lb 13.1 oz)  BSA (Calculated - sq m): 1.86 sq meters  BMI (Calculated): 25.3  Weight in (lb) to have BMI = 25: 159.3]    Physical " Exam  Vitals reviewed.   Constitutional:       Appearance: Normal appearance. She is normal weight.   HENT:      Head: Normocephalic and atraumatic.      Mouth/Throat:      Mouth: Mucous membranes are moist.      Pharynx: Oropharynx is clear.   Eyes:      Extraocular Movements: Extraocular movements intact.      Conjunctiva/sclera: Conjunctivae normal.      Pupils: Pupils are equal, round, and reactive to light.   Cardiovascular:      Rate and Rhythm: Normal rate and regular rhythm.      Pulses: Normal pulses.      Heart sounds: Normal heart sounds.   Pulmonary:      Effort: Pulmonary effort is normal.      Breath sounds: Normal breath sounds.   Musculoskeletal:         General: Normal range of motion.      Cervical back: Normal range of motion and neck supple.   Skin:     General: Skin is warm and dry.      Capillary Refill: Capillary refill takes less than 2 seconds.   Neurological:      General: No focal deficit present.      Mental Status: She is alert and oriented to person, place, and time. Mental status is at baseline.   Psychiatric:         Mood and Affect: Mood normal.         Behavior: Behavior normal.         Thought Content: Thought content normal.         Judgment: Judgment normal.     Answers for HPI/ROS submitted by the patient on 8/11/2022  activity change: No  unexpected weight change: No  neck pain: No  hearing loss: No  rhinorrhea: No  trouble swallowing: No  eye discharge: No  visual disturbance: No  chest tightness: No  wheezing: No  chest pain: No  palpitations: No  blood in stool: No  constipation: No  vomiting: No  diarrhea: No  polydipsia: No  polyuria: No  difficulty urinating: No  hematuria: No  menstrual problem: Yes  dysuria: No  joint swelling: No  arthralgias: No  headaches: No  weakness: No  confusion: No  dysphoric mood: No         Laboratory:  CBC:  Recent Labs   Lab 07/29/22  1016   WBC 3.60 L   RBC 4.92   Hemoglobin 15.2   Hematocrit 44.3   Platelets 318   MCV 90   MCH 30.9   MCHC  34.3     CMP:  Recent Labs   Lab 07/29/22  1016   Glucose 84   Calcium 10.6 H   Albumin 4.9   Total Protein 7.8   Sodium 138   Potassium 4.1   CO2 27   Chloride 102   BUN 10   Alkaline Phosphatase 62   ALT 18   AST 17   Total Bilirubin 0.6     URINALYSIS:  Recent Labs   Lab 07/29/22  1017   Color, UA Straw   Specific [place], UA 1.005   pH, UA 8.0   Protein, UA Negative   Nitrite, UA Negative   Leukocytes, UA Negative      LIPIDS:  Recent Labs   Lab 07/29/22  1016   TSH 0.866   HDL 48   Cholesterol 148   Triglycerides 61   LDL Cholesterol 87.8   HDL/Cholesterol Ratio 32.4   Non-HDL Cholesterol 100   Total Cholesterol/HDL Ratio 3.1     TSH:  Recent Labs   Lab 07/29/22  1016   TSH 0.866     A1C:        Radiology:        Assessment/Plan     Rajeev Rodgers is a 21 y.o.female with:    Urinary tract infection without hematuria, site unspecified  -     nitrofurantoin, macrocrystal-monohydrate, (MACROBID) 100 MG capsule; Take 1 capsule (100 mg total) by mouth 2 (two) times daily.  Dispense: 14 capsule; Refill: 0  -     Urinalysis, Reflex to Urine Culture Urine, Clean Catch    Premenstrual dysphoric disorder        As above, continue current medications and maintain follow up with specialists.  Return to clinic as needed.    I spent 20 minutes on the day of this encounter for preparing, evaluating, examining, treating, and discussing plan of care with this patient.  Greater than 50% of this time was spent face to face with patient.  All questions were answered to patient's satisfaction.    Karen L Spencer, NP-C [facility]

## 2022-08-12 ENCOUNTER — OFFICE VISIT (OUTPATIENT)
Dept: PRIMARY CARE CLINIC | Facility: CLINIC | Age: 22
End: 2022-08-12
Payer: COMMERCIAL

## 2022-08-12 VITALS
HEART RATE: 73 BPM | SYSTOLIC BLOOD PRESSURE: 126 MMHG | DIASTOLIC BLOOD PRESSURE: 82 MMHG | OXYGEN SATURATION: 99 % | TEMPERATURE: 99 F | RESPIRATION RATE: 18 BRPM | BODY MASS INDEX: 25.4 KG/M2 | HEIGHT: 67 IN | WEIGHT: 161.81 LBS

## 2022-08-12 DIAGNOSIS — F32.81 PREMENSTRUAL DYSPHORIC DISORDER: ICD-10-CM

## 2022-08-12 DIAGNOSIS — N39.0 URINARY TRACT INFECTION WITHOUT HEMATURIA, SITE UNSPECIFIED: Primary | ICD-10-CM

## 2022-08-12 PROBLEM — F32.A DEPRESSION: Status: ACTIVE | Noted: 2022-08-12

## 2022-08-12 LAB
BILIRUB UR QL STRIP: NEGATIVE
CLARITY UR REFRACT.AUTO: CLEAR
COLOR UR AUTO: COLORLESS
GLUCOSE UR QL STRIP: NEGATIVE
HGB UR QL STRIP: NEGATIVE
KETONES UR QL STRIP: NEGATIVE
LEUKOCYTE ESTERASE UR QL STRIP: NEGATIVE
NITRITE UR QL STRIP: NEGATIVE
PH UR STRIP: 8 [PH] (ref 5–8)
PROT UR QL STRIP: NEGATIVE
SP GR UR STRIP: 1 (ref 1–1.03)
URN SPEC COLLECT METH UR: ABNORMAL

## 2022-08-12 PROCEDURE — 1160F PR REVIEW ALL MEDS BY PRESCRIBER/CLIN PHARMACIST DOCUMENTED: ICD-10-PCS | Mod: CPTII,S$GLB,, | Performed by: NURSE PRACTITIONER

## 2022-08-12 PROCEDURE — 99213 PR OFFICE/OUTPT VISIT, EST, LEVL III, 20-29 MIN: ICD-10-PCS | Mod: S$GLB,,, | Performed by: NURSE PRACTITIONER

## 2022-08-12 PROCEDURE — 3008F PR BODY MASS INDEX (BMI) DOCUMENTED: ICD-10-PCS | Mod: CPTII,S$GLB,, | Performed by: NURSE PRACTITIONER

## 2022-08-12 PROCEDURE — 1160F RVW MEDS BY RX/DR IN RCRD: CPT | Mod: CPTII,S$GLB,, | Performed by: NURSE PRACTITIONER

## 2022-08-12 PROCEDURE — 81003 URINALYSIS AUTO W/O SCOPE: CPT | Performed by: NURSE PRACTITIONER

## 2022-08-12 PROCEDURE — 3074F SYST BP LT 130 MM HG: CPT | Mod: CPTII,S$GLB,, | Performed by: NURSE PRACTITIONER

## 2022-08-12 PROCEDURE — 99213 OFFICE O/P EST LOW 20 MIN: CPT | Mod: S$GLB,,, | Performed by: NURSE PRACTITIONER

## 2022-08-12 PROCEDURE — 3079F PR MOST RECENT DIASTOLIC BLOOD PRESSURE 80-89 MM HG: ICD-10-PCS | Mod: CPTII,S$GLB,, | Performed by: NURSE PRACTITIONER

## 2022-08-12 PROCEDURE — 1159F PR MEDICATION LIST DOCUMENTED IN MEDICAL RECORD: ICD-10-PCS | Mod: CPTII,S$GLB,, | Performed by: NURSE PRACTITIONER

## 2022-08-12 PROCEDURE — 3008F BODY MASS INDEX DOCD: CPT | Mod: CPTII,S$GLB,, | Performed by: NURSE PRACTITIONER

## 2022-08-12 PROCEDURE — 3074F PR MOST RECENT SYSTOLIC BLOOD PRESSURE < 130 MM HG: ICD-10-PCS | Mod: CPTII,S$GLB,, | Performed by: NURSE PRACTITIONER

## 2022-08-12 PROCEDURE — 3079F DIAST BP 80-89 MM HG: CPT | Mod: CPTII,S$GLB,, | Performed by: NURSE PRACTITIONER

## 2022-08-12 PROCEDURE — 99999 PR PBB SHADOW E&M-EST. PATIENT-LVL IV: ICD-10-PCS | Mod: PBBFAC,,, | Performed by: NURSE PRACTITIONER

## 2022-08-12 PROCEDURE — 99999 PR PBB SHADOW E&M-EST. PATIENT-LVL IV: CPT | Mod: PBBFAC,,, | Performed by: NURSE PRACTITIONER

## 2022-08-12 PROCEDURE — 1159F MED LIST DOCD IN RCRD: CPT | Mod: CPTII,S$GLB,, | Performed by: NURSE PRACTITIONER

## 2022-08-12 RX ORDER — NITROFURANTOIN 25; 75 MG/1; MG/1
100 CAPSULE ORAL 2 TIMES DAILY
Qty: 14 CAPSULE | Refills: 0 | Status: SHIPPED | OUTPATIENT
Start: 2022-08-12 | End: 2024-01-22

## 2022-09-12 ENCOUNTER — OFFICE VISIT (OUTPATIENT)
Dept: OBSTETRICS AND GYNECOLOGY | Facility: CLINIC | Age: 22
End: 2022-09-12
Payer: COMMERCIAL

## 2022-09-12 VITALS
BODY MASS INDEX: 24.99 KG/M2 | SYSTOLIC BLOOD PRESSURE: 134 MMHG | WEIGHT: 159.19 LBS | DIASTOLIC BLOOD PRESSURE: 88 MMHG | HEIGHT: 67 IN

## 2022-09-12 DIAGNOSIS — Z32.02 NEGATIVE PREGNANCY TEST: Primary | ICD-10-CM

## 2022-09-12 DIAGNOSIS — R10.2 PELVIC PAIN: ICD-10-CM

## 2022-09-12 DIAGNOSIS — N94.10 DYSPAREUNIA, FEMALE: ICD-10-CM

## 2022-09-12 LAB
B-HCG UR QL: NEGATIVE
CTP QC/QA: YES

## 2022-09-12 PROCEDURE — 99213 OFFICE O/P EST LOW 20 MIN: CPT | Mod: 25,S$GLB,, | Performed by: FAMILY MEDICINE

## 2022-09-12 PROCEDURE — 3008F PR BODY MASS INDEX (BMI) DOCUMENTED: ICD-10-PCS | Mod: CPTII,S$GLB,, | Performed by: FAMILY MEDICINE

## 2022-09-12 PROCEDURE — 87591 N.GONORRHOEAE DNA AMP PROB: CPT | Performed by: FAMILY MEDICINE

## 2022-09-12 PROCEDURE — 1160F PR REVIEW ALL MEDS BY PRESCRIBER/CLIN PHARMACIST DOCUMENTED: ICD-10-PCS | Mod: CPTII,S$GLB,, | Performed by: FAMILY MEDICINE

## 2022-09-12 PROCEDURE — 1160F RVW MEDS BY RX/DR IN RCRD: CPT | Mod: CPTII,S$GLB,, | Performed by: FAMILY MEDICINE

## 2022-09-12 PROCEDURE — 81025 POCT URINE PREGNANCY: ICD-10-PCS | Mod: S$GLB,,, | Performed by: FAMILY MEDICINE

## 2022-09-12 PROCEDURE — 3075F PR MOST RECENT SYSTOLIC BLOOD PRESS GE 130-139MM HG: ICD-10-PCS | Mod: CPTII,S$GLB,, | Performed by: FAMILY MEDICINE

## 2022-09-12 PROCEDURE — 87491 CHLMYD TRACH DNA AMP PROBE: CPT | Performed by: FAMILY MEDICINE

## 2022-09-12 PROCEDURE — 99999 PR PBB SHADOW E&M-EST. PATIENT-LVL III: CPT | Mod: PBBFAC,,, | Performed by: FAMILY MEDICINE

## 2022-09-12 PROCEDURE — 3075F SYST BP GE 130 - 139MM HG: CPT | Mod: CPTII,S$GLB,, | Performed by: FAMILY MEDICINE

## 2022-09-12 PROCEDURE — 99213 PR OFFICE/OUTPT VISIT, EST, LEVL III, 20-29 MIN: ICD-10-PCS | Mod: 25,S$GLB,, | Performed by: FAMILY MEDICINE

## 2022-09-12 PROCEDURE — 3008F BODY MASS INDEX DOCD: CPT | Mod: CPTII,S$GLB,, | Performed by: FAMILY MEDICINE

## 2022-09-12 PROCEDURE — 87481 CANDIDA DNA AMP PROBE: CPT | Mod: 59 | Performed by: FAMILY MEDICINE

## 2022-09-12 PROCEDURE — 3079F PR MOST RECENT DIASTOLIC BLOOD PRESSURE 80-89 MM HG: ICD-10-PCS | Mod: CPTII,S$GLB,, | Performed by: FAMILY MEDICINE

## 2022-09-12 PROCEDURE — 87801 DETECT AGNT MULT DNA AMPLI: CPT | Performed by: FAMILY MEDICINE

## 2022-09-12 PROCEDURE — 1159F MED LIST DOCD IN RCRD: CPT | Mod: CPTII,S$GLB,, | Performed by: FAMILY MEDICINE

## 2022-09-12 PROCEDURE — 99999 PR PBB SHADOW E&M-EST. PATIENT-LVL III: ICD-10-PCS | Mod: PBBFAC,,, | Performed by: FAMILY MEDICINE

## 2022-09-12 PROCEDURE — 3079F DIAST BP 80-89 MM HG: CPT | Mod: CPTII,S$GLB,, | Performed by: FAMILY MEDICINE

## 2022-09-12 PROCEDURE — 81025 URINE PREGNANCY TEST: CPT | Mod: S$GLB,,, | Performed by: FAMILY MEDICINE

## 2022-09-12 PROCEDURE — 1159F PR MEDICATION LIST DOCUMENTED IN MEDICAL RECORD: ICD-10-PCS | Mod: CPTII,S$GLB,, | Performed by: FAMILY MEDICINE

## 2022-09-12 NOTE — PROGRESS NOTES
"Chief Complaint: Pelvic Pain    HPI:   Rajeev Rodgers is a 22 y.o.  here for evaluation of pelvic pain and dyspareunia. She noticed it first a few months ago she would have pelvic/vaginal cramping with any type of sexual intercourse (vibrator and oral sex too). Male partner without protection. A few weeks ago she noticed more pinching pain. Now she is having the cramps without any sexual activity. No VD,VB, uti sx, fever. No specific concerns for stds. She had the kyleena placed in 2020 and spotting infrequently on it.  This is the extent of the patient's complaints at this time.     /88   Ht 5' 7" (1.702 m)   Wt 72.2 kg (159 lb 2.8 oz)   LMP  (LMP Unknown) Comment: IUD  BMI 24.93 kg/m²     Past Medical History:   Diagnosis Date    ADHD     Depression        Past Surgical History:   Procedure Laterality Date    INTRAUTERINE DEVICE INSERTION      KYLEENA       Family History   Problem Relation Age of Onset    Hypertension Father     Breast cancer Neg Hx     Colon cancer Neg Hx     Ovarian cancer Neg Hx        Social History     Socioeconomic History    Marital status: Single   Tobacco Use    Smoking status: Never    Smokeless tobacco: Never   Substance and Sexual Activity    Alcohol use: Yes    Drug use: Not Currently    Sexual activity: Yes     Partners: Male     Birth control/protection: I.U.D.       Current Outpatient Medications   Medication Sig Dispense Refill    buPROPion (WELLBUTRIN XL) 300 MG 24 hr tablet Take 300 mg by mouth every morning.      dextroamphetamine-amphetamine (ADDERALL XR) 30 MG 24 hr capsule Take 30 mg by mouth every morning.      meloxicam (MOBIC) 15 MG tablet Take 1 tablet (15 mg total) by mouth daily as needed (PMDD symptoms). 30 tablet 2    nitrofurantoin, macrocrystal-monohydrate, (MACROBID) 100 MG capsule Take 1 capsule (100 mg total) by mouth 2 (two) times daily. 14 capsule 0    ondansetron (ZOFRAN-ODT) 4 MG TbDL One tablet sublingual tid prn nausea 10 tablet 0 "     No current facility-administered medications for this visit.       Review of patient's allergies indicates:  No Known Allergies       OB History    Para Term  AB Living   0 0 0 0 0 0   SAB IAB Ectopic Multiple Live Births   0 0 0 0 0          ROS   General: Denies weight gain or loss,   Systemic: Not feeling tired (fatigue).  No fever chills   Gastrointestinal: No nausea, vomiting, no abdominal pain.  No diarrhea.  Pelvic: +pain and dyspareunia, no discharge, abnormal bleeding  Genitourinary: No dysuria, frequency, urgency      Physical Exam   Physical Exam:   Constitutional: She appears well-developed and well-nourished. She does not appear ill. No distress.               Genitourinary:    Right adnexa and left adnexa normal.      Pelvic exam was performed with patient prone.   The external female genitalia was normal.   Labial bartholins normal.There is no rash, tenderness or lesion on the right labia. There is no rash, tenderness or lesion on the left labia. Cervix is normal. Right adnexum displays no mass, no tenderness and no fullness. Left adnexum displays no mass, no tenderness and no fullness. There is vaginal discharge (scant white) in the vagina. No tenderness, bleeding, rectocele, cystocele or unspecified prolapse of vaginal walls in the vagina.    No foreign body in the vagina.   Cervix exhibits no motion tenderness, no lesion, no discharge, no friability, no lesion and no polyp. Uterus is tender. Normal urethral meatus.Urethra findings: no urethral massIUD strings visualized.              Neurological: She is alert. GCS eye subscore is 4. GCS verbal subscore is 5. GCS motor subscore is 6.       Results for orders placed or performed in visit on 22   POCT urine pregnancy   Result Value Ref Range    POC Preg Test, Ur Negative Negative     Acceptable Yes        Assessment/Plan:    Negative pregnancy test  -     POCT urine pregnancy    Pelvic pain  -     POCT urine  pregnancy  -     US Pelvis Comp with Transvag NON-OB (xpd; Future; Expected date: 2022  -     C. trachomatis/N. gonorrhoeae by AMP DNA [facility]; Cervicovaginal  -     Vaginosis Screen by DNA Probe    Dyspareunia, female    -r/o infections  -TVUS  Nsaids, tylenol, heating pad for pain  Will f/u with  her based on results        Answers submitted by the patient for this visit:  Pelvic Pain Questionnaire (Submitted on 2022)  Chief Complaint: Pelvic pain  Chronicity: recurrent  Onset: more than 1 month ago  Frequency: intermittently  Progression since onset: gradually worsening  Pain severity: moderate  Affected side: Middle  Pregnant now?: No  abdominal pain: Yes  anorexia: No  back pain: No  chills: No  constipation: No  diarrhea: No  discolored urine: No  dysuria: No  fever: No  flank pain: No  frequency: No  headaches: No  hematuria: No  nausea: No  painful intercourse: Yes  rash: No  urgency: No  vomiting: No  Aggravated by: intercourse, menstrual cycle  treatments tried: NSAIDs  Improvement on treatment: moderate  Sexual activity: sexually active  Partner with STD symptoms: no  Birth control: an IUD  Menstrual history: irregular  STD: No  abdominal surgery: No   section: No  Ectopic pregnancy: No  Endometriosis: No  herpes simplex: No  gynecological surgery: No  menorrhagia: No  metrorrhagia: No  miscarriage: No  ovarian cysts: No  perineal abscess: No  PID: No  terminated pregnancy: No  vaginosis: No

## 2022-09-15 LAB
BACTERIAL VAGINOSIS DNA: NEGATIVE
C TRACH DNA SPEC QL NAA+PROBE: NOT DETECTED
CANDIDA GLABRATA DNA: NEGATIVE
CANDIDA KRUSEI DNA: NEGATIVE
CANDIDA RRNA VAG QL PROBE: NEGATIVE
N GONORRHOEA DNA SPEC QL NAA+PROBE: NOT DETECTED
T VAGINALIS RRNA GENITAL QL PROBE: NEGATIVE

## 2022-09-23 ENCOUNTER — PATIENT MESSAGE (OUTPATIENT)
Dept: OBSTETRICS AND GYNECOLOGY | Facility: CLINIC | Age: 22
End: 2022-09-23
Payer: COMMERCIAL

## 2022-09-23 ENCOUNTER — HOSPITAL ENCOUNTER (OUTPATIENT)
Dept: RADIOLOGY | Facility: OTHER | Age: 22
Discharge: HOME | End: 2022-09-23
Attending: FAMILY MEDICINE
Payer: COMMERCIAL

## 2022-09-23 DIAGNOSIS — R10.2 PELVIC PAIN: ICD-10-CM

## 2022-09-23 PROCEDURE — 76830 US PELVIS COMP WITH TRANSVAG NON-OB (XPD): ICD-10-PCS | Mod: 26,,, | Performed by: RADIOLOGY

## 2022-09-23 PROCEDURE — 76856 US EXAM PELVIC COMPLETE: CPT | Mod: 26,,, | Performed by: RADIOLOGY

## 2022-09-23 PROCEDURE — 76830 TRANSVAGINAL US NON-OB: CPT | Mod: 26,,, | Performed by: RADIOLOGY

## 2022-09-23 PROCEDURE — 76830 TRANSVAGINAL US NON-OB: CPT | Mod: TC

## 2022-09-23 PROCEDURE — 76856 US PELVIS COMP WITH TRANSVAG NON-OB (XPD): ICD-10-PCS | Mod: 26,,, | Performed by: RADIOLOGY

## 2022-11-14 PROBLEM — N39.0 UTI (URINARY TRACT INFECTION): Status: RESOLVED | Noted: 2022-08-12 | Resolved: 2022-11-14

## 2023-12-19 ENCOUNTER — TELEPHONE (OUTPATIENT)
Dept: FAMILY MEDICINE | Facility: CLINIC | Age: 23
End: 2023-12-19
Payer: COMMERCIAL

## 2024-01-03 DIAGNOSIS — M54.30 SCIATICA: Primary | ICD-10-CM

## 2024-01-04 ENCOUNTER — CLINICAL SUPPORT (OUTPATIENT)
Dept: REHABILITATION | Facility: HOSPITAL | Age: 24
End: 2024-01-04
Payer: COMMERCIAL

## 2024-01-04 DIAGNOSIS — M54.32 SCIATICA OF LEFT SIDE: Primary | ICD-10-CM

## 2024-01-04 PROCEDURE — 97810 ACUP 1/> WO ESTIM 1ST 15 MIN: CPT | Mod: PN

## 2024-01-04 NOTE — PROGRESS NOTES
Acupuncture Evaluation Note     Name: Rajeev Rodgers  Clinic Number: [id number]    Traditional Chinese Medicine (TCM) Diagnosis: Qi Stagnation and Blood Stasis  Medical Diagnosis: Sciatica     Evaluation Date: 1/4/2024    Visit #/Visits authorized: 1/ 12  (maximum 30)     Precautions: Standard    Subjective     Chief Concern: Chronic sciatica, severe low back pain, numbness in left leg and shooting pain down the back down to the heel of her foot. Pain is constant, pain is worse when she sits for long periods of time.    Low back pain onset 10 years ago, off and on, was a  at the time and did a lot of exercises for conditioning. has had worse pain for the past 2 years (pain level '6' or higher). For the last 2 months the pain at '8'. Now taking a lot of ibuprofen. Back pain is on left side at the lateral lower sacral area.  Pain radiating into leg started about 6 months ago.     Can not do all the exercises she used to do  - now just doing yoga. Pain makes it difficult to fall asleep    Nothing relieves the pain. Receives chiropractic, it is not helping. No history of physical trauma, possibly from history of weight lifting.     Medical necessity is demonstrated by the following IMPAIRMENTS: Medical Necessity: Decreased mobility limits day to day activities, social, and emergent situations and Decreased quality of life     HEENT:  no headaches, seasonal allergies managed with medication    Chest:  no shortness of breath, no palpitations    Digestion:     Taste/Appetite: appetite is good; history of eating disorders not currently; eats a range of foods, lots of veggies; Lost 50 lbs over a year ago and had stable weight since; no issues with digestions acid reflux/gas/bloating; no issues with constipation/diarrhea except side effect from medication occasionally    Sleep:   disturbed by pain    Energy Levels:  pretty good    Psychological Symptoms:  PMDD (see GYN below) cyclical depression    Occupation:  Works in public health remotely    Other Symptoms:    Cold/heat - tend to cold slightly  Aversion to wind  - no issue  Sweating  no issues with sweating    GYN Symptoms:   Has an IUD for 4 years for birth control, period is irregular, ,  PMDD  premenstrual depressive disorder, happens with the IUD for 1-2 weeks every cycle, had the depressive cycles prior to IUD too.     Objective     Observation: good espinoza    Tongue:  wide slightly swollen, pale, white coating in center    Pulse:  wiry    Treatment     Treatment Principles:  Regulate Qi and Blood; stop pain    Needle Set 1 -     Acupuncture points used:     Bilateral:  BL10, BL23, BL25, Pablo Chuck  Left:  ewa lateral border sacrum +3, piriformis, BL36, BL37, BL40, BL57 BL60  Right:  Centerline:    Needles In: 17  Needles Out: 17  Needles W/O STIM placed:   1:35  Needles W/O STIM removed:   1:55    Other Traditional Chinese Medicine Modalities -  HEAT LAMP    Assessment     After treatment, patient felt  tingly sensations but no pain on standing     Patient prognosis is Fair.     Patient will continue to benefit from acupuncture treatment to address the deficits listed in the problem list box on initial evaluation, provide patient family education and to maximize pt's level of independence in the home and community environment.     Patient's spiritual, cultural and educational needs considered and pt agreeable to plan of care and goals.       Anticipated barriers to treatment:   none    Plan     Recommend 1 /week for 6 sessions then re-assess.      Recommendations:  none at this time    Education:  Patient is aware of cumulative effect of acupuncture

## 2024-01-11 ENCOUNTER — CLINICAL SUPPORT (OUTPATIENT)
Dept: REHABILITATION | Facility: HOSPITAL | Age: 24
End: 2024-01-11
Payer: COMMERCIAL

## 2024-01-11 DIAGNOSIS — M54.32 SCIATICA OF LEFT SIDE: Primary | ICD-10-CM

## 2024-01-11 PROCEDURE — 97810 ACUP 1/> WO ESTIM 1ST 15 MIN: CPT | Mod: PN

## 2024-01-11 NOTE — PROGRESS NOTES
Acupuncture Treatment Note     Name: Rajeev Rodgers  Clinic Number: [id number]    Traditional Chinese Medicine Diagnosis: Qi Stagnation and Blood Stasis    Date of Service: 1/11/2024     Medical Diagnosis:Sciatica   Evaluation Date: 01/04/2024    Visit #/Visits authorized: 2/ 12  (maximum 30)     Precautions: Standard    Subjective     Chief Concern:    Chronic sciatica, severe low back pain, numbness in left leg and shooting pain down the back down to the heel of her foot.      Medical necessity is demonstrated by the following IMPAIRMENTS: Medical Necessity: Decreased mobility limits day to day activities, social, and emergent situations and Decreased quality of life    Aggravating Factors:  prolonged sitting   Relieving Factors:  nothing    Symptom Description:     Quality:  Burning, Numb, Electric, Shooting, and Variable  Severity:  8  Frequency:  continuously    Response to Previous Treatment:    Rajeev had less pain for about an hour after the treatment, then the pain returned with the addition of a burning sensation at the back of her thigh radiating to her calf.     Other Condition/Symptoms:    - Sleep:   disturbed by pain   - PMDD  premenstrual depressive disorder       Objective      New Findings:    none    Pulse:    not assessed    Tongue:    not assessed    Treatment      Treatment Principles:    Regulate Qi and Blood; stop pain     Needle Set 1 -     Acupuncture Points:    Sitting, mobilize area during needle retention  Bilateral:  BL32, BL25  Right:    Left:  ewa lateral and distal to sacrum +4  Centerline:      #NEEDLES IN:   8  #NEEDLES OUT:   8  NEEDLES W/O STIM  placed:    3:45  NEEDLES W/O STIM removed:    4:00    Other Traditional Chinese Medicine Modalities:   Gua sha at sacrum and left lower back (lying on side and standing) - no strong sha; take home press tacks left ear (espinoza men, pt zero, thalamas, cyngulate gyrus)      Assessment      Analysis of Treatment:    Rajeev felt some reduction in  pain, though still painful. She will see how she feels over the next few days    Pt prognosis is Fair.     Patient will continue to benefit from acupuncture treatment to address the deficits listed in the problem list box on initial evaluation, provide patient family education and to maximize pt's level of independence in the home and community environment.     Patient's spiritual, cultural and educational needs considered and pt agreeable to plan of care and goals.     Anticipated barriers to treatment:   none    Plan     Recommend    continue with care plan.     Education:  Patient is aware of cumulative effect of acupuncture

## 2024-01-18 ENCOUNTER — CLINICAL SUPPORT (OUTPATIENT)
Dept: REHABILITATION | Facility: HOSPITAL | Age: 24
End: 2024-01-18
Payer: COMMERCIAL

## 2024-01-18 DIAGNOSIS — M54.32 SCIATICA OF LEFT SIDE: Primary | ICD-10-CM

## 2024-01-18 PROCEDURE — 97811 ACUP 1/> W/O ESTIM EA ADD 15: CPT | Mod: PN

## 2024-01-18 PROCEDURE — 97810 ACUP 1/> WO ESTIM 1ST 15 MIN: CPT | Mod: PN

## 2024-01-18 NOTE — PROGRESS NOTES
Acupuncture Treatment Note     Name: Rajeev Rodgers  Clinic Number: [id number]    Traditional Chinese Medicine Diagnosis: Qi Stagnation and Blood Stasis    Date of Service: 1/18/2024     Medical Diagnosis:Sciatica   Evaluation Date: 01/04/2024    Visit #/Visits authorized: 3 / 12  (maximum 30)     Precautions: Standard    Subjective     Chief Concern:    Chronic sciatica, severe low back pain, numbness in left leg and shooting pain down the back down to the heel of her foot.      Medical necessity is demonstrated by the following IMPAIRMENTS: Medical Necessity: Decreased mobility limits day to day activities, social, and emergent situations and Decreased quality of life    Aggravating Factors:  prolonged sitting   Relieving Factors:  nothing    Symptom Description:     Quality:  Burning, Numb, Electric, Shooting, and Variable  Severity:  8  Frequency:  continuously    Response to Previous Treatment:    No change after last treatment. No improvement. Rajeev will see her PCP next week.    Other Condition/Symptoms:    - Sleep:   disturbed by pain   - PMDD  premenstrual depressive disorder       Objective      New Findings:    none    Pulse:    not assessed    Tongue:    not assessed    Treatment      Treatment Principles:    Regulate Qi and Blood; stop pain     Needle Set 1 -     Acupuncture Points:    Face down -   Bilateral:  BL32, SI joint  Right:    Left:  GB30 area +5, BL36, BL37, BL40, BL58, BL60  Centerline:      #NEEDLES IN:  14  #NEEDLES OUT:   14  NEEDLES W/O STIM  placed:    3:40  NEEDLES W/O STIM removed:    4:25    Other Traditional Chinese Medicine Modalities:   none      Assessment      Analysis of Treatment:    Rajeev no longer had electric shock sensations on standing after the treatment (pain lessened) .  She will see how she feels over the next few days    Pt prognosis is Fair.     Patient will continue to benefit from acupuncture treatment to address the deficits listed in the problem list box on  initial evaluation, provide patient family education and to maximize pt's level of independence in the home and community environment.     Patient's spiritual, cultural and educational needs considered and pt agreeable to plan of care and goals.     Anticipated barriers to treatment:   none    Plan     Recommend    continue with care plan.     Education:  Patient is aware of cumulative effect of acupuncture

## 2024-01-22 ENCOUNTER — HOSPITAL ENCOUNTER (EMERGENCY)
Facility: HOSPITAL | Age: 24
Discharge: HOME | End: 2024-01-22
Attending: EMERGENCY MEDICINE
Payer: COMMERCIAL

## 2024-01-22 ENCOUNTER — OFFICE VISIT (OUTPATIENT)
Dept: PRIMARY CARE CLINIC | Facility: CLINIC | Age: 24
End: 2024-01-22
Attending: FAMILY MEDICINE
Payer: COMMERCIAL

## 2024-01-22 VITALS
DIASTOLIC BLOOD PRESSURE: 94 MMHG | OXYGEN SATURATION: 97 % | TEMPERATURE: 99 F | WEIGHT: 158.19 LBS | RESPIRATION RATE: 18 BRPM | SYSTOLIC BLOOD PRESSURE: 150 MMHG | HEART RATE: 76 BPM | BODY MASS INDEX: 24.77 KG/M2

## 2024-01-22 VITALS
DIASTOLIC BLOOD PRESSURE: 86 MMHG | HEIGHT: 67 IN | BODY MASS INDEX: 26.61 KG/M2 | WEIGHT: 169.56 LBS | SYSTOLIC BLOOD PRESSURE: 142 MMHG | OXYGEN SATURATION: 96 % | HEART RATE: 71 BPM

## 2024-01-22 DIAGNOSIS — M54.32 SCIATICA OF LEFT SIDE: Primary | ICD-10-CM

## 2024-01-22 DIAGNOSIS — G89.29 CHRONIC LEFT-SIDED LOW BACK PAIN WITH LEFT-SIDED SCIATICA: Primary | ICD-10-CM

## 2024-01-22 DIAGNOSIS — R03.0 ELEVATED BLOOD-PRESSURE READING WITHOUT DIAGNOSIS OF HYPERTENSION: ICD-10-CM

## 2024-01-22 DIAGNOSIS — M54.42 CHRONIC LEFT-SIDED LOW BACK PAIN WITH LEFT-SIDED SCIATICA: Primary | ICD-10-CM

## 2024-01-22 DIAGNOSIS — Z23 NEEDS FLU SHOT: ICD-10-CM

## 2024-01-22 LAB
B-HCG UR QL: NEGATIVE
CTP QC/QA: YES

## 2024-01-22 PROCEDURE — 3077F SYST BP >= 140 MM HG: CPT | Mod: CPTII,S$GLB,, | Performed by: FAMILY MEDICINE

## 2024-01-22 PROCEDURE — 3079F DIAST BP 80-89 MM HG: CPT | Mod: CPTII,S$GLB,, | Performed by: FAMILY MEDICINE

## 2024-01-22 PROCEDURE — 63600175 PHARM REV CODE 636 W HCPCS: Performed by: PHYSICIAN ASSISTANT

## 2024-01-22 PROCEDURE — 99284 EMERGENCY DEPT VISIT MOD MDM: CPT

## 2024-01-22 PROCEDURE — 99214 OFFICE O/P EST MOD 30 MIN: CPT | Mod: S$GLB,,, | Performed by: FAMILY MEDICINE

## 2024-01-22 PROCEDURE — 96372 THER/PROPH/DIAG INJ SC/IM: CPT | Performed by: PHYSICIAN ASSISTANT

## 2024-01-22 PROCEDURE — 99999 PR PBB SHADOW E&M-EST. PATIENT-LVL III: CPT | Mod: PBBFAC,,, | Performed by: FAMILY MEDICINE

## 2024-01-22 PROCEDURE — 3008F BODY MASS INDEX DOCD: CPT | Mod: CPTII,S$GLB,, | Performed by: FAMILY MEDICINE

## 2024-01-22 PROCEDURE — G0008 ADMIN INFLUENZA VIRUS VAC: HCPCS | Mod: S$GLB,,, | Performed by: FAMILY MEDICINE

## 2024-01-22 PROCEDURE — 81025 URINE PREGNANCY TEST: CPT | Performed by: PHYSICIAN ASSISTANT

## 2024-01-22 PROCEDURE — 90686 IIV4 VACC NO PRSV 0.5 ML IM: CPT | Mod: S$GLB,,, | Performed by: FAMILY MEDICINE

## 2024-01-22 RX ORDER — TIZANIDINE 4 MG/1
4 TABLET ORAL NIGHTLY PRN
Qty: 30 TABLET | Refills: 0 | Status: SHIPPED | OUTPATIENT
Start: 2024-01-22 | End: 2024-03-04

## 2024-01-22 RX ORDER — IBUPROFEN 800 MG/1
800 TABLET ORAL 3 TIMES DAILY PRN
Qty: 60 TABLET | Refills: 0 | Status: SHIPPED | OUTPATIENT
Start: 2024-01-22

## 2024-01-22 RX ORDER — METHYLPREDNISOLONE 4 MG/1
TABLET ORAL
Qty: 1 EACH | Refills: 0 | Status: SHIPPED | OUTPATIENT
Start: 2024-01-23

## 2024-01-22 RX ORDER — METHYLPREDNISOLONE SOD SUCC 125 MG
125 VIAL (EA) INJECTION
Status: COMPLETED | OUTPATIENT
Start: 2024-01-22 | End: 2024-01-22

## 2024-01-22 RX ADMIN — METHYLPREDNISOLONE SODIUM SUCCINATE 125 MG: 125 INJECTION, POWDER, FOR SOLUTION INTRAMUSCULAR; INTRAVENOUS at 09:01

## 2024-01-22 NOTE — Clinical Note
"Rajeev Morris" Ana Maria was seen and treated in our emergency department on 1/22/2024.  She may return to work on 01/22/2024.       If you have any questions or concerns, please don't hesitate to call.      Ilana Palomares PA-C"

## 2024-01-22 NOTE — ED NOTES
C/C: 23 y.o. female arrived to the ED via POV for c/o back pain. Patient with recent dx of sciatica (01/2024) reports intensifying pain to left sacral region with radiation down LLE. Patient endorses 8/10 pain that is worse when sitting and mildly alleviated upon standing and ambulation. Patient reports recent physical activity over the weekend and feels as if that may have exacerbated her pain. Denies bowel/bladder incontinence, fevers, chills, chest pain, or SOB.     APPEARANCE: awake, alert, and appears to be moderate discomfort. Pain score 8/10.   SKIN: warm, dry and intact. No breakdown or bruising.  MUSCULOSKELETAL: Patient moving all extremities spontaneously, no obvious swelling or deformities noted. +sacral back pain with increased pain while sitting   RESPIRATORY: Denies shortness of breath.Respirations unlabored.   CARDIAC: Denies CP, 2+ distal pulses; no peripheral edema  ABDOMEN: S/ND/NT, Denies N/V/D  : Pt voids spontaneously, denies dysuria, hematuria, or incontinence   NEUROLOGIC: AAO x 4; speaking and following commands appropriately. Equal strength in all extremities; denies numbness/tingling. +sharp, shooting pain down LLE

## 2024-01-22 NOTE — ED PROVIDER NOTES
Encounter Date: 1/22/2024       History     Chief Complaint   Patient presents with    Back Pain     X3 years     9:01 AM  Patient is a 23-year-old female with a history of ADHD who presents to [facility] ED for left-sided low back/buttock pain that radiates down left leg for the past 6 months.     Patient told triage that she has had pain for 3 years.  She states for the past 6 months she has had daily pain to her left buttock with pain radiating down the leg to the posterior side daily.  She has seen an acupuncturist with mild improvement.  She thinks that it actually makes her pain more intense after treatment.  She has plans to see her PCP today, but given worsening pain, she presents to [facility] ED today.  Her partner was in town, and she had intercourse over the weekend and believes that this has exacerbated her pain.  Her pain is in the same location just more intense and constant.  She is unable to lay or sit on her back secondary to worsening pain.  Her pain radiates down her posterior leg.  She feels uncomfortable.  Normally it feels sore but now her pain is intense. She has not had fever, chills, bowel incontinence, bladder incontinence, lower extremity weakness, difficulty ambulating.  She denies IV drug use.    She has never had an injury, fallen, or trauma.  She thinks that this started all after an exercise.   She had ibuprofen 600 mg every 4 hours over the weekend without resolution or relief. Last dose at 2 AM.        Review of patient's allergies indicates:  No Known Allergies  Past Medical History:   Diagnosis Date    ADHD     Depression      Past Surgical History:   Procedure Laterality Date    INTRAUTERINE DEVICE INSERTION  2020    KYLEENA     Family History   Problem Relation Age of Onset    Hypertension Father     Breast cancer Neg Hx     Colon cancer Neg Hx     Ovarian cancer Neg Hx      Social History     Tobacco Use    Smoking status: Never    Smokeless tobacco: Never   Substance Use Topics    Alcohol  use: Yes    Drug use: Not Currently     Review of Systems   Constitutional:  Positive for activity change. Negative for chills, diaphoresis and fever.   HENT:  Negative for sore throat.    Respiratory:  Negative for shortness of breath.    Cardiovascular:  Negative for chest pain.   Gastrointestinal:  Negative for abdominal pain, diarrhea (no bowel incont.), nausea and vomiting.   Genitourinary:  Negative for difficulty urinating (no bladder incontinence) and dysuria.   Musculoskeletal:  Positive for back pain.   Skin:  Negative for rash.   Neurological:  Positive for numbness (lower L leg). Negative for weakness.   Hematological:  Does not bruise/bleed easily.       Physical Exam     Initial Vitals [01/22/24 0809]   BP Pulse Resp Temp SpO2   (!) 150/94 76 18 98.6 °F (37 °C) 97 %      MAP       --         Physical Exam    Vitals reviewed.  Constitutional: She appears well-developed and well-nourished. She is not diaphoretic. She is cooperative.  Non-toxic appearance. She does not have a sickly appearance. She does not appear ill. No distress.   HENT:   Head: Normocephalic and atraumatic.   Nose: Nose normal.   Mouth/Throat: No trismus in the jaw.   Eyes: Conjunctivae and EOM are normal.   Neck:   Normal range of motion.  Pulmonary/Chest: No accessory muscle usage. No tachypnea. No respiratory distress.   Abdominal: She exhibits no distension.   Musculoskeletal:         General: Normal range of motion.      Cervical back: Normal range of motion.      Comments: Laying on stomach to relieve pain.   No difficulty bearing weight or ambulating independently.   FROM and 5/5 strength of bilateral LE.   Sensations grossly intact.      Neurological: She is alert. She has normal strength.   Reflex Scores:       Patellar reflexes are 2+ on the right side and 2+ on the left side.       Achilles reflexes are 2+ on the right side and 2+ on the left side.  Skin: Skin is warm and dry. No erythema. No pallor.         ED Course    Procedures  Labs Reviewed   POCT URINE PREGNANCY          Imaging Results    None          Medications   methylPREDNISolone sodium succinate injection 125 mg (125 mg Intramuscular Given 1/22/24 0934)     Medical Decision Making  Patient is a 23-year-old female with a history of ADHD who presents to [facility] ED for left-sided low back/buttock pain that radiates down left leg for the past 6 months.     Differential diagnosis includes but is not limited to lumbar radiculopathy, sciatica, neuropathy, DDD, strain, sprain, tendinopathy, sacroiliitis.  No bony tenderness.  No history of trauma.  Doubt fractures or dislocations.  She has had the same pain for the past 6 months, just not worse.  No acute red flag symptoms.  Low suspicion for spinal cord compression or cauda equina.  Vitals are stable.  No IV drug use.  Doubt spinal infection/abscess.    Amount and/or Complexity of Data Reviewed  Labs: ordered.    Risk  Prescription drug management.               ED Course as of 01/22/24 0937   Mon Jan 22, 2024   0855 BP(!): 150/94 [CL]   0855 Temp: 98.6 °F (37 °C) [CL]   0855 Pulse: 76 [CL]   0855 Resp: 18 [CL]   0855 SpO2: 97 % [CL]   0927 Patient has a reassuring physical exam.  Has acute on chronic symptoms.  No acute red flag symptoms.  She does not need any emergent MRI at this time, but I do recommend that she get one in the outpatient setting eventually since she has had pain for 6 months.  Getting an emergent MRI at this time will not change my management.  I do not have any suspicion for infection, cauda equina, or spinal cord compression.  No bony tenderness or trauma to suggest fractures or dislocations.  We will treat conservatively.  Steroid injection now.  Start Medrol Dosepak tomorrow.  Continue NSAIDs, acetaminophen, ice, and heat.  Avoid anything that exacerbates pain.  Referral placed back and spine.  She is seeing her PCP today. All of her questions were answered.  Patient comfortable with plan and stable for  discharge. [CL]      ED Course User Index  [CL] Ilana Palomares PA-C                           Clinical Impression:  Final diagnoses:  [M54.32] Sciatica of left side (Primary)          ED Disposition Condition    Discharge Stable          ED Prescriptions       Medication Sig Dispense Start Date End Date Auth. Provider    methylPREDNISolone (MEDROL DOSEPACK) 4 mg tablet Take pack as directed. 1 each 1/23/2024 -- Ilana Palomares PA-C          Follow-up Information       Follow up With Specialties Details Why Contact Info Additional Information    [facility] Call   [address], [address]  [facility] [address] [phone] [facility] - [facility], 4th Floor Please park in [address] and use [place] elevators    [facility] Emergency Medicine  If symptoms worsen [address]  [facility] [address] [phone]           Future Appointments   Date Time Provider Department Center   1/22/2024  2:00 PM Belinda Ruiz DO [facility] [place]   1/25/2024  3:30 PM Shadia Rogers, BLANCHE [facility] [place]   2/1/2024  3:30 PM Shadia Rogers, LAC [facility] [place]   2/5/2024  4:00 PM Shadia Rogers, LAC [facility] [place]          Ilana Palomares PA-C  01/22/24 0939

## 2024-01-22 NOTE — PROGRESS NOTES
"FAMILY MEDICINE  [facility]    Reason for visit:   Chief Complaint   Patient presents with    Back Pain         SUBJECTIVE: Rajeev Rodgers is a 23 y.o. female  - with depression and ADHD presents as a new patient with concerns for low back pain with radiation down her left leg.    Gynecology Mike Cervantes, NP  Psychiatry Dr. Cindi Siddiqi MD    1. Back pain with sciatica    Rajeev Rodgers reports that she is suffer from chronic low back pain for several years.  She reports in the last 2 years it seems to have worsened and localized to left buttock.  She reports previously the pain was tenderness in her bilateral back and but next that was intermittent and tolerable.  Now it is pain just in her left buttock with radiation down her leg.  She reports that has a constant 6/10 and can worsen at times.  She reports it is sharp and shooting with occasional "zinging" pains and reports today was very sensitive to touch which is not normal.    She is tried to focus on conservative message.  She increased exercise and has lost about 50 lb in the last 2 years.  She also does yoga and core strengthening.  She also started to see a chiropractor in 1 week ago she started to see an acupuncturist.  She reports despite all her treatment measures her pain continues to worsen.    Today her pain was severe and she had to present to the ER.  She was given a steroid injection.  She reports that it seems to have improved though still very sensitive.      She has not had any MRI imaging.  She is scheduled with the [facility] 01/24/2024.    She reports the pain is so severe that she has had to stop exercising.  It affects her gait.  She reports the pain is worse with flexion.  It is also worse with sitting for long periods of time.  She does have to sit for work.  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  She has not had any relief with any of those medications.  She has not tried a muscle relaxer.    In the last 4 months she has been unable to flex forward.  She does notice improvement in pain with extension.          Review of Systems   All other systems reviewed and are negative.      HEALTH MAINTENANCE:   Health Maintenance   Topic Date Due    HPV Vaccines (1 - 2-dose series) Never done    Chlamydia Screening  09/12/2023    Pap Smear  05/24/2025    TETANUS VACCINE  07/23/2031    Hepatitis C Screening  Completed    Lipid Panel  Completed     Health Maintenance Topics with due status: Not Due       Topic Last Completion Date    TETANUS VACCINE 07/23/2021    Pap Smear 05/24/2022     Health Maintenance Due   Topic Date Due    HPV Vaccines (1 - 2-dose series) Never done    Influenza Vaccine (1) Never done    COVID-19 Vaccine (5 - 2023-24 season) 09/01/2023    Chlamydia Screening  09/12/2023       HISTORY:   Past Medical History:   Diagnosis Date    ADHD     Depression     Eating disorder     Binge eating 10 years       Past Surgical History:   Procedure Laterality Date    INTRAUTERINE DEVICE INSERTION  2020    KYLEENA       Family History   Problem Relation Age of Onset    Depression Mother     Diabetes Father     Hypertension Father     Chronic back pain Father     Neuropathy Father     No Known Problems Sister     No Known Problems Maternal Grandmother     No Known Problems Maternal Grandfather     No Known Problems Paternal Grandmother     No Known Problems Paternal Grandfather     Breast cancer Neg Hx     Colon cancer Neg Hx     Ovarian cancer Neg Hx        Social History     Tobacco Use    Smoking status: Never    Smokeless tobacco: Never   Substance Use Topics    Alcohol use: Yes    Drug use: Not Currently       Social History     Social History Narrative    Single. Public health major.   "      ALLERGIES:   Review of patient's allergies indicates:  No Known Allergies    MEDS:   Current Outpatient Medications on File Prior to Visit   Medication Sig Dispense Refill Last Dose    dextroamphetamine-amphetamine (ADDERALL XR) 20 MG 24 hr capsule Take 1 capsule by mouth every morning 30 capsule 0 Taking    dextroamphetamine-amphetamine (ADDERALL XR) 20 MG 24 hr capsule Take 1 capsule (20 mg total) by mouth every morning. 30 capsule 0 Taking    dextroamphetamine-amphetamine (ADDERALL XR) 30 MG 24 hr capsule Take 30 mg by mouth every morning.   Taking    buPROPion (WELLBUTRIN XL) 150 MG TB24 tablet Take 1 to 2 tablets by mouth every morning (Patient not taking: Reported on 1/22/2024) 60 tablet 5 Not Taking    buPROPion (WELLBUTRIN XL) 300 MG 24 hr tablet Take 300 mg by mouth every morning.   Not Taking    dextroamphetamine-amphetamine 10 mg Tab Take one tablet by mouth twice daily. (Patient not taking: Reported on 1/22/2024) 60 tablet 0 Not Taking    [DISCONTINUED] meloxicam (MOBIC) 15 MG tablet Take 1 tablet (15 mg total) by mouth daily as needed (PMDD symptoms). (Patient not taking: Reported on 1/22/2024) 30 tablet 2 Not Taking    [DISCONTINUED] nitrofurantoin, macrocrystal-monohydrate, (MACROBID) 100 MG capsule Take 1 capsule (100 mg total) by mouth 2 (two) times daily. 14 capsule 0     [DISCONTINUED] ondansetron (ZOFRAN-ODT) 4 MG TbDL One tablet sublingual tid prn nausea 10 tablet 0          Vital signs:   Vitals:    01/22/24 1405   BP: (!) 164/80   BP Location: Left arm   Patient Position: Sitting   BP Method: Medium (Automatic)   Pulse: 71   SpO2: 96%   Weight: 76.9 kg (169 lb 8.5 oz)   Height: 5' 7" (1.702 m)     Body mass index is 26.55 kg/m².    PHYSICAL EXAM:     Physical Exam  Constitutional:       General: She is not in acute distress.  Cardiovascular:      Rate and Rhythm: Normal rate and regular rhythm.      Heart sounds: Normal heart sounds. No murmur heard.     No friction rub. No gallop. "   Pulmonary:      Effort: Pulmonary effort is normal.      Breath sounds: Normal breath sounds. No wheezing, rhonchi or rales.   Musculoskeletal:      Cervical back: Neck supple.      Thoracic back: Normal.      Lumbar back: Tenderness present. No swelling. Decreased range of motion. Positive left straight leg raise test. Negative right straight leg raise test.      Right lower leg: No edema.      Left lower leg: No edema.      Comments: Decreased range of motion noted with forward flexion.  Pain relieved with extension.  Left side bending slightly decreased from right side bending.  Slight discomfort noticed with bilateral rotation   Neurological:      Mental Status: She is alert.      Deep Tendon Reflexes:      Reflex Scores:       Patellar reflexes are 2+ on the right side and 2+ on the left side.       Achilles reflexes are 2+ on the right side and 2+ on the left side.            PERTINENT RESULTS:   No imaging lumbar spine  X-Ray Cervical Spine AP And Lateral  Order: [id number]  Status: Final result       Visible to patient: Yes (seen)       Next appt: 01/24/2024 at 03:00 PM in Spine Services (Kaylee Miller NP)       Dx: Acute midline back pain, unspecified ...    0 Result Notes  Details    Reading Physician Reading Date Result Priority   Belinda Bautista MD  [phone] 6/18/2022 STAT     Narrative & Impression  EXAMINATION:  XR CERVICAL SPINE AP LATERAL     CLINICAL HISTORY:  Dorsalgia, unspecified     TECHNIQUE:  AP, lateral and open mouth views of the cervical spine were performed.     COMPARISON:  None.     FINDINGS:  Straightening of the normal cervical lordosis.  Vertebral body heights are maintained.  Disc spaces are within normal limits.  Prevertebral soft tissues appear normal.  No fracture or subluxation.     Impression:     As above.        Electronically signed by: Belinda Bautista MD  Date:                                            06/18/2022  Time:                                            10:46                     EXAMINATION:  XR THORACIC SPINE AP LATERAL     CLINICAL HISTORY:  Dorsalgia, unspecified     TECHNIQUE:  AP and lateral views of the thoracic spine were performed.     COMPARISON:  None     FINDINGS:  Minimal dextroscoliotic curvature of the midthoracic spine.  Vertebral body heights are maintained.  Disc spaces are within normal limits.  No fracture or subluxation is seen.     Impression:     As above.        Electronically signed by: Belinda Bautista MD  Date:                                            06/18/2022  Time:                                           10:45       ASSESSMENT/PLAN:    1. Chronic left-sided low back pain with left-sided sciatica  Overview:  - with signs of neurological claudication   -recommend MRI lumbar spine   -agree with evaluation by back and Spine Clinic   -recommend pain recurs okay to take ibuprofen 800 mg 3 times a day with food and tizanidine 4 mg at bedtime    Orders:  -     MRI Lumbar Spine Without Contrast; Future; Expected date: 01/22/2024  -     ibuprofen (ADVIL,MOTRIN) 800 MG tablet; Take 1 tablet (800 mg total) by mouth 3 (three) times daily as needed for Pain.  Dispense: 60 tablet; Refill: 0  -     tiZANidine (ZANAFLEX) 4 MG tablet; Take 1 tablet (4 mg total) by mouth nightly as needed (pain).  Dispense: 30 tablet; Refill: 0    2. Needs flu shot  -     Influenza - Quadrivalent (PF)    3. Elevated blood-pressure reading without diagnosis of hypertension  Overview:  BP Readings from Last 4 Encounters:   01/22/24 (!) 164/80   01/22/24 (!) 150/94   09/12/22 134/88   08/12/22 126/82     - maybe secondary to pain   - no prior issues with hypertension unless related with pain  -recommend monitor closely               ORDERS:   Orders Placed This Encounter    MRI Lumbar Spine Without Contrast    Influenza - Quadrivalent (PF)    ibuprofen (ADVIL,MOTRIN) 800 MG tablet    tiZANidine (ZANAFLEX) 4 MG tablet       Vaccines recommended:  HPV series, influenza,  COVID-19    Follow-up in 6 weeks or sooner if any concerns.      This note is dictated using the M*Modal Fluency Direct word recognition program. There are word recognition mistakes that are occasionally missed on review.    Dr. Belinda Ruiz D.O.   [facility]

## 2024-01-22 NOTE — DISCHARGE INSTRUCTIONS
You were given a steroid injection in the emergency department.  Start steroid Dosepak tomorrow for the next 5 days.    Take ibuprofen 600-800 mg every 6 hours as needed with food for anti-inflammatory relief. Take with food to avoid stomach upset (gastritis vs ulcer).   You can take acetaminophen/tylenol 650 mg every 6 hours or 1000 mg every 8 hours for added relief.  Apply ice to the area for 10-20 minutes every 4 hours. You can apply heat 2 days after for the same duration and frequency.  Follow up with Back and Spine and PCP.  Return to the ER for new or worsening symptoms.  Future Appointments   Date Time Provider Department Center   1/22/2024  2:00 PM Belinda Ruiz DO [facility]   1/25/2024  3:30 PM Shadia Rogers [facility] [place]   2/1/2024  3:30 PM Shadia Rogers [facility] [place]   2/5/2024  4:00 PM [facility] Rogers [facility]

## 2024-01-23 ENCOUNTER — PATIENT MESSAGE (OUTPATIENT)
Dept: PRIMARY CARE CLINIC | Facility: CLINIC | Age: 24
End: 2024-01-23
Payer: COMMERCIAL

## 2024-02-01 ENCOUNTER — OFFICE VISIT (OUTPATIENT)
Dept: SPINE | Facility: CLINIC | Age: 24
End: 2024-02-01
Payer: COMMERCIAL

## 2024-02-01 ENCOUNTER — HOSPITAL ENCOUNTER (OUTPATIENT)
Dept: RADIOLOGY | Facility: OTHER | Age: 24
Discharge: HOME | End: 2024-02-01
Attending: NURSE PRACTITIONER
Payer: COMMERCIAL

## 2024-02-01 VITALS
OXYGEN SATURATION: 100 % | HEIGHT: 67 IN | BODY MASS INDEX: 26.53 KG/M2 | WEIGHT: 169 LBS | RESPIRATION RATE: 18 BRPM | DIASTOLIC BLOOD PRESSURE: 77 MMHG | HEART RATE: 58 BPM | SYSTOLIC BLOOD PRESSURE: 133 MMHG

## 2024-02-01 DIAGNOSIS — M51.36 DDD (DEGENERATIVE DISC DISEASE), LUMBAR: ICD-10-CM

## 2024-02-01 DIAGNOSIS — M54.32 SCIATICA OF LEFT SIDE: ICD-10-CM

## 2024-02-01 DIAGNOSIS — M47.817 SPONDYLOSIS WITHOUT MYELOPATHY OR RADICULOPATHY, LUMBOSACRAL REGION: ICD-10-CM

## 2024-02-01 DIAGNOSIS — M54.9 DORSALGIA, UNSPECIFIED: Primary | ICD-10-CM

## 2024-02-01 DIAGNOSIS — S39.012A STRAIN OF LUMBAR REGION, INITIAL ENCOUNTER: ICD-10-CM

## 2024-02-01 DIAGNOSIS — M54.9 DORSALGIA, UNSPECIFIED: ICD-10-CM

## 2024-02-01 PROCEDURE — 99204 OFFICE O/P NEW MOD 45 MIN: CPT | Mod: S$GLB,,, | Performed by: NURSE PRACTITIONER

## 2024-02-01 PROCEDURE — 3075F SYST BP GE 130 - 139MM HG: CPT | Mod: CPTII,S$GLB,, | Performed by: NURSE PRACTITIONER

## 2024-02-01 PROCEDURE — 1160F RVW MEDS BY RX/DR IN RCRD: CPT | Mod: CPTII,S$GLB,, | Performed by: NURSE PRACTITIONER

## 2024-02-01 PROCEDURE — 3008F BODY MASS INDEX DOCD: CPT | Mod: CPTII,S$GLB,, | Performed by: NURSE PRACTITIONER

## 2024-02-01 PROCEDURE — 72114 X-RAY EXAM L-S SPINE BENDING: CPT | Mod: 26,,, | Performed by: RADIOLOGY

## 2024-02-01 PROCEDURE — 72114 X-RAY EXAM L-S SPINE BENDING: CPT | Mod: TC,FY

## 2024-02-01 PROCEDURE — 3078F DIAST BP <80 MM HG: CPT | Mod: CPTII,S$GLB,, | Performed by: NURSE PRACTITIONER

## 2024-02-01 PROCEDURE — 1159F MED LIST DOCD IN RCRD: CPT | Mod: CPTII,S$GLB,, | Performed by: NURSE PRACTITIONER

## 2024-02-01 PROCEDURE — 99999 PR PBB SHADOW E&M-EST. PATIENT-LVL V: CPT | Mod: PBBFAC,,, | Performed by: NURSE PRACTITIONER

## 2024-02-01 RX ORDER — METHOCARBAMOL 500 MG/1
500 TABLET, FILM COATED ORAL 2 TIMES DAILY PRN
Qty: 60 TABLET | Refills: 1 | Status: SHIPPED | OUTPATIENT
Start: 2024-02-01 | End: 2024-04-01

## 2024-02-01 NOTE — PROGRESS NOTES
Subjective:     Patient ID: Rajeev Rodgers is a 23 y.o. female.    Chief Complaint: Low-back Pain (Left side)    HPI Ms. Rodgers is a 23-year-old female here for evaluation of low back pain    Chronic back for the past 2 years, gradually worsening  Recent flare-up with sciatica type symptoms down the left leg, positive numbness  Symptoms have improved with steroids, but still reports some soreness  She is doing PT at [facility] which is helping  PCP ordered lumbar MRI, but insurance denied it    Past Medical History:   Diagnosis Date    ADHD     Depression     Eating disorder     Binge eating 10 years       Past Surgical History:   Procedure Laterality Date    INTRAUTERINE DEVICE INSERTION  2020    KYLEENA       Family History   Problem Relation Age of Onset    Depression Mother     Diabetes Father     Hypertension Father     Chronic back pain Father     Neuropathy Father     No Known Problems Sister     No Known Problems Maternal Grandmother     No Known Problems Maternal Grandfather     No Known Problems Paternal Grandmother     No Known Problems Paternal Grandfather     Breast cancer Neg Hx     Colon cancer Neg Hx     Ovarian cancer Neg Hx        Social History     Socioeconomic History    Marital status: Single   Tobacco Use    Smoking status: Never    Smokeless tobacco: Never   Substance and Sexual Activity    Alcohol use: Yes    Drug use: Not Currently    Sexual activity: Yes     Partners: Male     Birth control/protection: I.U.D.   Social History Narrative    Single. Public health major.        Current Outpatient Medications   Medication Sig Dispense Refill    buPROPion (WELLBUTRIN XL) 300 MG 24 hr tablet Take 300 mg by mouth every morning.      dextroamphetamine-amphetamine (ADDERALL XR) 20 MG 24 hr capsule Take 1 capsule by mouth every morning 30 capsule 0    dextroamphetamine-amphetamine (ADDERALL XR) 20 MG 24 hr capsule Take 1 capsule (20 mg total) by mouth every morning. 30 capsule 0     dextroamphetamine-amphetamine (ADDERALL XR) 30 MG 24 hr capsule Take 30 mg by mouth every morning.      dextroamphetamine-amphetamine 10 mg Tab Take one tablet by mouth twice daily. 60 tablet 0    ibuprofen (ADVIL,MOTRIN) 800 MG tablet Take 1 tablet (800 mg total) by mouth 3 (three) times daily as needed for Pain. 60 tablet 0    methylPREDNISolone (MEDROL DOSEPACK) 4 mg tablet Take pack as directed. 1 each 0    tiZANidine (ZANAFLEX) 4 MG tablet Take 1 tablet (4 mg total) by mouth nightly as needed (pain). 30 tablet 0    buPROPion (WELLBUTRIN XL) 150 MG TB24 tablet Take 1 to 2 tablets by mouth every morning (Patient not taking: Reported on 1/22/2024) 60 tablet 5     No current facility-administered medications for this visit.       Review of patient's allergies indicates:  No Known Allergies      Review of Systems   Constitutional: Negative for fever.   Cardiovascular:  Negative for chest pain.   Respiratory:  Negative for shortness of breath.    Musculoskeletal:  Positive for back pain.   Gastrointestinal:  Negative for bowel incontinence.   Genitourinary:  Negative for bladder incontinence.   Neurological:  Positive for numbness.        Objective:     General: Rajeev is well-developed, well-nourished, appears stated age, in no acute distress, alert and oriented to time, place and person.     General    Vitals reviewed.  Constitutional: She is oriented to person, place, and time. She appears well-developed and well-nourished.   HENT:   Head: Atraumatic.   Nose: Nose normal.   Eyes: Conjunctivae are normal.   Cardiovascular:  Normal rate.            Pulmonary/Chest: Effort normal.   Neurological: She is alert and oriented to person, place, and time.   Psychiatric: She has a normal mood and affect. Her behavior is normal. Judgment and thought content normal.     General Musculoskeletal Exam   Gait: normal         Right Hip Exam     Tests   Pain w/ forced internal rotation (JOSUE): absent  Left Hip Exam     Tests   Pain  w/ forced internal rotation (JOSUE): absent      Back (L-Spine & T-Spine) / Neck (C-Spine) Exam     Tenderness Left paramedian tenderness of the Lower L-Spine and Sacrum.     Back (L-Spine & T-Spine) Range of Motion   Extension:  10   Flexion:  90     Spinal Sensation   Right Side Sensation  L-Spine Level: normal  S-Spine Level: normal  Left Side Sensation  L-Spine Level: normal  S-Spine Level: normal    Other   She has no scoliosis .  Spinal Kyphosis:  Absent      Muscle Strength   Right Upper Extremity   Biceps: 5/5   Deltoid:  5/5  Triceps:  5/5  Left Upper Extremity  Biceps: 5/5   Deltoid:  5/5  Triceps:  5/5  Right Lower Extremity   Hip Flexion: 5/5   Quadriceps:  5/5   Ankle Dorsiflexion:  5/5   Anterior tibial:  5/5   EHL:  5/5  Left Lower Extremity   Hip Flexion: 5/5   Quadriceps:  5/5   Ankle Dorsiflexion:  5/5   Anterior tibial:  5/5   EHL:  5/5    Reflexes     Left Side  Biceps:  2+  Brachioradialis:  2+  Achilles:  2+    Right Side   Biceps:  2+  Brachioradialis:  2+  Achilles:  2+    Vascular Exam     Right Pulses        Carotid:                  2+    Left Pulses        Carotid:                  2+          Assessment:     1. Dorsalgia, unspecified    2. Sciatica of left side    3. Spondylosis without myelopathy or radiculopathy, lumbosacral region    4. DDD (degenerative disc disease), lumbar    5. Strain of lumbar region, initial encounter         Plan:        Prior records reviewed today  We discussed back pain and the nature of back pain.  We discussed that it is not one thing that causes the pain but an accumulation of multiple things that we do.    Order lumbar x-ray  Continue physical therapy at [facility]  Rx Robaxin 500 mg as needed for muscle pain  Consider lumbar MRI if no improvement with PT  We discussed posture sitting and the importance of trying to sit better.    We discussed the benefits of therapy and exercise and continuing to move.   Return for follow-up in 8 weeks    More than 50% of  the total time  of 45 minutes was spent face to face in counseling on diagnosis and treatment options. I also counseled patient  on common and most usual side effect of prescribed medications.  I reviewed Primary care , and other specialty's notes to better coordinate patient's care. All questions were answered, and patient voiced understanding.      Follow-up: No follow-ups on file. If there are any questions prior to this, the patient was instructed to contact the office.

## 2024-03-01 NOTE — PROGRESS NOTES
"FAMILY MEDICINE  [facility]    Reason for visit:   Chief Complaint   Patient presents with    Follow-up    Sciatica         SUBJECTIVE: Rajeev Rodgers is a 23 y.o. female  - with depression and ADHD presents for follow-up of left sciatica. She would also like STD screening.  She denies any symptoms.  She reports that she recently ended a relationship and would like STD screening.    Gynecology Mike Cervantes, NP  Psychiatry Dr. Cidni Siddiqi MD  Back and Spine Kaylee Miller, NP    1. Back pain with sciatica    Since last visit  Rajeev Rodgers reports that her symptoms have greatly improved.  She is continued with physical therapy.  She reports it has been helpful.  She takes Robaxin 500 mg at night as needed.  She reports that the pain is no longer present but she does have a tightness of her left posterior thigh.  She also has decreased range of motion.  X-ray imaging showed grade 1 lumbar spondylolisthesis.  She is overall pleased with her progress.  She is hoping she can get some more range of motion. (I reviewed back and Spine note from 2/1/24 and she has an upcoming visit 3/14/24)      Prior note 1/22/2024:   "Rajeev Rodgers reports that she is suffer from chronic low back pain for several years.  She reports in the last 2 years it seems to have worsened and localized to left buttock.  She reports previously the pain was tenderness in her bilateral back and but next that was intermittent and tolerable.  Now it is pain just in her left buttock with radiation down her leg.  She reports that has a constant 6/10 and can worsen at times.  She reports it is sharp and shooting with occasional "zinging" pains and reports today was very sensitive to touch which is not normal.  She is tried to focus on conservative message.  She increased exercise and has lost about 50 lb in the last 2 years.  She also does yoga and core strengthening.  She also started to see a chiropractor in 1 week ago " "she started to see an acupuncturist.  She reports despite all her treatment measures her pain continues to worsen.  Today her pain was severe and she had to present to the ER.  She was given a steroid injection.  She reports that it seems to have improved though still very sensitive.    She has not had any MRI imaging.  She is scheduled with the [facility] 01/24/2024.  She reports the pain is so severe that she has had to stop exercising.  It affects her gait.  She reports the pain is worse with flexion.  It is also worse with sitting for long periods of time.  She does have to sit for work.  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  She has not had any relief with any of those medications.  She has not tried a muscle relaxer.  In the last 4 months she has been unable to flex forward.  She does notice improvement in pain with extension."          Review of Systems   All other systems reviewed and are negative.      HEALTH MAINTENANCE:   Health Maintenance   Topic Date Due    HPV Vaccines (1 - 2-dose series) Never done    Chlamydia Screening  09/12/2023    Pap Smear  05/24/2025    TETANUS VACCINE  07/23/2031    Hepatitis C Screening  Completed    Lipid Panel  Completed     Health Maintenance Topics with due status: Not Due       Topic Last Completion Date    TETANUS VACCINE 07/23/2021    Pap Smear 05/24/2022     Health Maintenance Due   Topic Date Due    HPV Vaccines (1 - 2-dose series) Never done    COVID-19 Vaccine (5 - 2023-24 season) 09/01/2023    Chlamydia Screening  09/12/2023       HISTORY:   Past Medical History:   Diagnosis Date    ADHD     Depression     Eating disorder     Binge eating 10 years       Past Surgical History:   Procedure Laterality Date    INTRAUTERINE DEVICE INSERTION  " 2020    KYLEENA       Family History   Problem Relation Age of Onset    Depression Mother     Diabetes Father     Hypertension Father     Chronic back pain Father     Neuropathy Father     No Known Problems Sister     No Known Problems Maternal Grandmother     No Known Problems Maternal Grandfather     No Known Problems Paternal Grandmother     No Known Problems Paternal Grandfather     Breast cancer Neg Hx     Colon cancer Neg Hx     Ovarian cancer Neg Hx        Social History     Tobacco Use    Smoking status: Never    Smokeless tobacco: Never   Substance Use Topics    Alcohol use: Yes    Drug use: Not Currently       Social History     Social History Narrative    Single. Public health major.        ALLERGIES:   Review of patient's allergies indicates:  No Known Allergies    MEDS:   Current Outpatient Medications on File Prior to Visit   Medication Sig Dispense Refill Last Dose    buPROPion (WELLBUTRIN XL) 300 MG 24 hr tablet Take 300 mg by mouth every morning.   Taking    dextroamphetamine-amphetamine (ADDERALL XR) 20 MG 24 hr capsule Take 1 capsule by mouth every morning 30 capsule 0 Taking    dextroamphetamine-amphetamine (ADDERALL XR) 20 MG 24 hr capsule Take 1 capsule (20 mg total) by mouth every morning. 30 capsule 0 Taking    dextroamphetamine-amphetamine (ADDERALL XR) 30 MG 24 hr capsule Take 30 mg by mouth every morning.   Taking    dextroamphetamine-amphetamine 10 mg Tab Take one tablet by mouth twice daily. 60 tablet 0 Taking    methocarbamoL (ROBAXIN) 500 MG Tab Take 1 tablet (500 mg total) by mouth 2 (two) times daily as needed (muscle pain). 60 tablet 1 Taking    buPROPion (WELLBUTRIN XL) 150 MG TB24 tablet Take 1 to 2 tablets by mouth every morning (Patient not taking: Reported on 1/22/2024) 60 tablet 5 Not Taking    ibuprofen (ADVIL,MOTRIN) 800 MG tablet Take 1 tablet (800 mg total) by mouth 3 (three) times daily as needed for Pain. 60 tablet 0     methylPREDNISolone (MEDROL DOSEPACK) 4 mg tablet  "Take pack as directed. 1 each 0     [DISCONTINUED] tiZANidine (ZANAFLEX) 4 MG tablet Take 1 tablet (4 mg total) by mouth nightly as needed (pain). 30 tablet 0          Vital signs:   Vitals:    03/04/24 1305   BP: 122/70   Pulse: 64   SpO2: 100%   Weight: 78.3 kg (172 lb 8.2 oz)   Height: 5' 7" (1.702 m)       Body mass index is 27.02 kg/m².    PHYSICAL EXAM:     Physical Exam  Constitutional:       General: She is not in acute distress.  Pulmonary:      Effort: Pulmonary effort is normal. No respiratory distress.   Musculoskeletal:      Lumbar back: No bony tenderness. Decreased range of motion. Positive left straight leg raise test. Negative right straight leg raise test.      Comments: Decreased lumbar flexion   Neurological:      Mental Status: She is alert.   Psychiatric:         Speech: Speech normal.             PERTINENT RESULTS:   X-Ray Lumbar Complete Including Flex And Ext  Narrative: EXAMINATION:  XR LUMBAR SPINE 5 VIEW WITH FLEX AND EXT    CLINICAL HISTORY:  Low back pain, no red flags, no prior management;Low back pain, increased fracture risk;Spinal fusion, lumbar, follow up;Osteoarthritis, lumbar;  Dorsalgia, unspecified    TECHNIQUE:  Five views of the lumbar spine plus flexion extension views were performed.    COMPARISON:  None.    FINDINGS:  Bones are well mineralized.  Alignment is satisfactory in the frontal projection.  The neutral lateral views demonstrate grade 1 spondylolisthesis at L5-S1.  This does not appear to change significantly with flexion and extension.  No evidence of spondylolysis.  No significant hypertrophic degenerative changes.  No fracture or osseous destruction.  Pedicles appear intact.  IUD in place.  Impression: Grade 1 spondylolisthesis at L5-S1 without evidence of spondylolysis.  IUD in place.    Electronically signed by: Altaf Payne MD  Date:    02/01/2024  Time:    16:11          ASSESSMENT/PLAN:    1. Sciatica of left side  Overview:  - with signs of neurological " claudication   2/1/24 Xray lumbar spine: Grade 1 spondylolisthesis at L5-S1 without evidence of spondylolysis   - followed by [facility] and spine clinic  - improving  - continue with PT      2. Spondylolisthesis of lumbar region  Overview:  2/1/24 Xray lumbar spine: Grade 1 spondylolisthesis at L5-S1 without evidence of spondylolysis   - followed by Back and spine clinic  - improving  - continue with PT      3. Screening for HIV (human immunodeficiency virus)  -     HIV 1/2 Ag/Ab (4th Gen); Future; Expected date: 03/04/2024    4. Screen for STD (sexually transmitted disease)  -     C. trachomatis/N. gonorrhoeae by AMP DNA [facility]; Urine; Future; Expected date: 03/04/2024  -     RPR; Future; Expected date: 03/04/2024          ORDERS:   Orders Placed This Encounter    C. trachomatis/N. gonorrhoeae by AMP DNA [facility]; Urine    HIV 1/2 Ag/Ab (4th Gen)    RPR         Vaccines recommended:  HPV serie, COVID-19    Follow up if symptoms worsen or fail to improve.        This note is dictated using the M*Modal Fluency Direct word recognition program. There are word recognition mistakes that are occasionally missed on review.    Dr. Belinda Ruiz D.O.   [facility]

## 2024-03-04 ENCOUNTER — OFFICE VISIT (OUTPATIENT)
Dept: PRIMARY CARE CLINIC | Facility: CLINIC | Age: 24
End: 2024-03-04
Attending: FAMILY MEDICINE
Payer: COMMERCIAL

## 2024-03-04 VITALS
SYSTOLIC BLOOD PRESSURE: 122 MMHG | HEART RATE: 64 BPM | OXYGEN SATURATION: 100 % | DIASTOLIC BLOOD PRESSURE: 70 MMHG | WEIGHT: 172.5 LBS | HEIGHT: 67 IN | BODY MASS INDEX: 27.07 KG/M2

## 2024-03-04 DIAGNOSIS — Z11.3 SCREEN FOR STD (SEXUALLY TRANSMITTED DISEASE): ICD-10-CM

## 2024-03-04 DIAGNOSIS — M43.16 SPONDYLOLISTHESIS OF LUMBAR REGION: ICD-10-CM

## 2024-03-04 DIAGNOSIS — Z11.4 SCREENING FOR HIV (HUMAN IMMUNODEFICIENCY VIRUS): ICD-10-CM

## 2024-03-04 DIAGNOSIS — M54.32 SCIATICA OF LEFT SIDE: Primary | ICD-10-CM

## 2024-03-04 PROBLEM — R03.0 ELEVATED BLOOD-PRESSURE READING WITHOUT DIAGNOSIS OF HYPERTENSION: Status: RESOLVED | Noted: 2024-01-22 | Resolved: 2024-03-04

## 2024-03-04 PROCEDURE — 3008F BODY MASS INDEX DOCD: CPT | Mod: CPTII,S$GLB,, | Performed by: FAMILY MEDICINE

## 2024-03-04 PROCEDURE — 1160F RVW MEDS BY RX/DR IN RCRD: CPT | Mod: CPTII,S$GLB,, | Performed by: FAMILY MEDICINE

## 2024-03-04 PROCEDURE — 3074F SYST BP LT 130 MM HG: CPT | Mod: CPTII,S$GLB,, | Performed by: FAMILY MEDICINE

## 2024-03-04 PROCEDURE — 3078F DIAST BP <80 MM HG: CPT | Mod: CPTII,S$GLB,, | Performed by: FAMILY MEDICINE

## 2024-03-04 PROCEDURE — 99214 OFFICE O/P EST MOD 30 MIN: CPT | Mod: S$GLB,,, | Performed by: FAMILY MEDICINE

## 2024-03-04 PROCEDURE — 1159F MED LIST DOCD IN RCRD: CPT | Mod: CPTII,S$GLB,, | Performed by: FAMILY MEDICINE

## 2024-03-04 PROCEDURE — 99999 PR PBB SHADOW E&M-EST. PATIENT-LVL III: CPT | Mod: PBBFAC,,, | Performed by: FAMILY MEDICINE

## 2024-03-14 ENCOUNTER — LAB VISIT (OUTPATIENT)
Dept: LAB | Facility: OTHER | Age: 24
End: 2024-03-14
Attending: FAMILY MEDICINE
Payer: COMMERCIAL

## 2024-03-14 ENCOUNTER — OFFICE VISIT (OUTPATIENT)
Dept: SPINE | Facility: CLINIC | Age: 24
End: 2024-03-14
Payer: COMMERCIAL

## 2024-03-14 VITALS
BODY MASS INDEX: 26.99 KG/M2 | OXYGEN SATURATION: 100 % | DIASTOLIC BLOOD PRESSURE: 87 MMHG | RESPIRATION RATE: 12 BRPM | SYSTOLIC BLOOD PRESSURE: 142 MMHG | HEIGHT: 67 IN | HEART RATE: 89 BPM | WEIGHT: 171.94 LBS

## 2024-03-14 DIAGNOSIS — Z11.4 SCREENING FOR HIV (HUMAN IMMUNODEFICIENCY VIRUS): ICD-10-CM

## 2024-03-14 DIAGNOSIS — S39.012A STRAIN OF LUMBAR REGION, INITIAL ENCOUNTER: ICD-10-CM

## 2024-03-14 DIAGNOSIS — M54.32 SCIATICA OF LEFT SIDE: Primary | ICD-10-CM

## 2024-03-14 DIAGNOSIS — M54.16 LUMBAR RADICULOPATHY, CHRONIC: ICD-10-CM

## 2024-03-14 DIAGNOSIS — Z11.3 SCREEN FOR STD (SEXUALLY TRANSMITTED DISEASE): ICD-10-CM

## 2024-03-14 DIAGNOSIS — M51.36 DDD (DEGENERATIVE DISC DISEASE), LUMBAR: ICD-10-CM

## 2024-03-14 DIAGNOSIS — M47.817 SPONDYLOSIS WITHOUT MYELOPATHY OR RADICULOPATHY, LUMBOSACRAL REGION: ICD-10-CM

## 2024-03-14 DIAGNOSIS — M54.9 DORSALGIA, UNSPECIFIED: ICD-10-CM

## 2024-03-14 LAB
HIV 1+2 AB+HIV1 P24 AG SERPL QL IA: NORMAL
RPR SER QL: NORMAL

## 2024-03-14 PROCEDURE — 87491 CHLMYD TRACH DNA AMP PROBE: CPT | Performed by: FAMILY MEDICINE

## 2024-03-14 PROCEDURE — 99214 OFFICE O/P EST MOD 30 MIN: CPT | Mod: S$GLB,,, | Performed by: NURSE PRACTITIONER

## 2024-03-14 PROCEDURE — 86592 SYPHILIS TEST NON-TREP QUAL: CPT | Performed by: FAMILY MEDICINE

## 2024-03-14 PROCEDURE — 87389 HIV-1 AG W/HIV-1&-2 AB AG IA: CPT | Performed by: FAMILY MEDICINE

## 2024-03-14 PROCEDURE — 1159F MED LIST DOCD IN RCRD: CPT | Mod: CPTII,S$GLB,, | Performed by: NURSE PRACTITIONER

## 2024-03-14 PROCEDURE — 3079F DIAST BP 80-89 MM HG: CPT | Mod: CPTII,S$GLB,, | Performed by: NURSE PRACTITIONER

## 2024-03-14 PROCEDURE — 1160F RVW MEDS BY RX/DR IN RCRD: CPT | Mod: CPTII,S$GLB,, | Performed by: NURSE PRACTITIONER

## 2024-03-14 PROCEDURE — 99999 PR PBB SHADOW E&M-EST. PATIENT-LVL IV: CPT | Mod: PBBFAC,,, | Performed by: NURSE PRACTITIONER

## 2024-03-14 PROCEDURE — 3008F BODY MASS INDEX DOCD: CPT | Mod: CPTII,S$GLB,, | Performed by: NURSE PRACTITIONER

## 2024-03-14 PROCEDURE — 36415 COLL VENOUS BLD VENIPUNCTURE: CPT | Performed by: FAMILY MEDICINE

## 2024-03-14 PROCEDURE — 3077F SYST BP >= 140 MM HG: CPT | Mod: CPTII,S$GLB,, | Performed by: NURSE PRACTITIONER

## 2024-03-14 NOTE — PROGRESS NOTES
Subjective:     Patient ID: Rajeev Rodgers is a 23 y.o. female.    Chief Complaint: Rectal Pain    Interval History 3/14/2024  Ms. Rodgers presents today for follow-up.  Currently in PT with improvement overall in her symptoms, but she does report persistent numbness down the back her leg and in her foot    HPI Ms. Rodgers is a 23-year-old female here for evaluation of low back pain    Chronic back for the past 2 years, gradually worsening  Recent flare-up with sciatica type symptoms down the left leg, positive numbness  Symptoms have improved with steroids, but still reports some soreness  She is doing PT at [facility] which is helping  PCP ordered lumbar MRI, but insurance denied it    Lumbar xray 2024    FINDINGS:  Bones are well mineralized.  Alignment is satisfactory in the frontal projection.  The neutral lateral views demonstrate grade 1 spondylolisthesis at L5-S1.  This does not appear to change significantly with flexion and extension.  No evidence of spondylolysis.  No significant hypertrophic degenerative changes.  No fracture or osseous destruction.  Pedicles appear intact.  IUD in place.     Impression:     Grade 1 spondylolisthesis at L5-S1 without evidence of spondylolysis.  IUD in place.    Past Medical History:   Diagnosis Date    ADHD     Depression     Eating disorder     Binge eating 10 years       Past Surgical History:   Procedure Laterality Date    INTRAUTERINE DEVICE INSERTION  2020    KYLEENA       Family History   Problem Relation Age of Onset    Depression Mother     Diabetes Father     Hypertension Father     Chronic back pain Father     Neuropathy Father     No Known Problems Sister     No Known Problems Maternal Grandmother     No Known Problems Maternal Grandfather     No Known Problems Paternal Grandmother     No Known Problems Paternal Grandfather     Breast cancer Neg Hx     Colon cancer Neg Hx     Ovarian cancer Neg Hx        Social History     Socioeconomic History    Marital status:  Single   Tobacco Use    Smoking status: Never    Smokeless tobacco: Never   Substance and Sexual Activity    Alcohol use: Yes    Drug use: Not Currently    Sexual activity: Yes     Partners: Male     Birth control/protection: I.U.D.   Social History Narrative    Single. Public health major.        Current Outpatient Medications   Medication Sig Dispense Refill    buPROPion (WELLBUTRIN XL) 300 MG 24 hr tablet Take 300 mg by mouth every morning.      dextroamphetamine-amphetamine (ADDERALL XR) 30 MG 24 hr capsule Take 30 mg by mouth every morning.      ibuprofen (ADVIL,MOTRIN) 800 MG tablet Take 1 tablet (800 mg total) by mouth 3 (three) times daily as needed for Pain. 60 tablet 0    methocarbamoL (ROBAXIN) 500 MG Tab Take 1 tablet (500 mg total) by mouth 2 (two) times daily as needed (muscle pain). 60 tablet 1    methylPREDNISolone (MEDROL DOSEPACK) 4 mg tablet Take pack as directed. 1 each 0    buPROPion (WELLBUTRIN XL) 150 MG TB24 tablet Take 1 to 2 tablets by mouth every morning (Patient not taking: Reported on 1/22/2024) 60 tablet 5    dextroamphetamine-amphetamine (ADDERALL XR) 20 MG 24 hr capsule Take 1 capsule by mouth every morning (Patient not taking: Reported on 3/14/2024) 30 capsule 0    dextroamphetamine-amphetamine (ADDERALL XR) 20 MG 24 hr capsule Take 1 capsule (20 mg total) by mouth every morning. (Patient not taking: Reported on 3/14/2024) 30 capsule 0    dextroamphetamine-amphetamine 10 mg Tab Take one tablet by mouth twice daily. (Patient not taking: Reported on 3/14/2024) 60 tablet 0     No current facility-administered medications for this visit.       Review of patient's allergies indicates:  No Known Allergies      Review of Systems   Constitutional: Negative for fever.   Cardiovascular:  Negative for chest pain.   Respiratory:  Negative for shortness of breath.    Musculoskeletal:  Positive for back pain.   Gastrointestinal:  Negative for bowel incontinence.   Genitourinary:  Negative for  bladder incontinence.   Neurological:  Positive for numbness.        Objective:     General: Rajeev is well-developed, well-nourished, appears stated age, in no acute distress, alert and oriented to time, place and person.     General    Vitals reviewed.  Constitutional: She is oriented to person, place, and time. She appears well-developed and well-nourished.   HENT:   Head: Atraumatic.   Nose: Nose normal.   Eyes: Conjunctivae are normal.   Cardiovascular:  Normal rate.            Pulmonary/Chest: Effort normal.   Neurological: She is alert and oriented to person, place, and time.   Psychiatric: She has a normal mood and affect. Her behavior is normal. Judgment and thought content normal.     General Musculoskeletal Exam   Gait: normal         Right Hip Exam     Tests   Pain w/ forced internal rotation (JOSUE): absent  Left Hip Exam     Tests   Pain w/ forced internal rotation (JOSUE): absent      Back (L-Spine & T-Spine) / Neck (C-Spine) Exam     Tenderness Left paramedian tenderness of the Lower L-Spine and Sacrum.     Back (L-Spine & T-Spine) Range of Motion   Extension:  10   Flexion:  90     Spinal Sensation   Right Side Sensation  L-Spine Level: normal  S-Spine Level: normal  Left Side Sensation  L-Spine Level: normal  S-Spine Level: normal    Other   She has no scoliosis .  Spinal Kyphosis:  Absent      Muscle Strength   Right Upper Extremity   Biceps: 5/5   Deltoid:  5/5  Triceps:  5/5  Left Upper Extremity  Biceps: 5/5   Deltoid:  5/5  Triceps:  5/5  Right Lower Extremity   Hip Flexion: 5/5   Quadriceps:  5/5   Ankle Dorsiflexion:  5/5   Anterior tibial:  5/5   EHL:  5/5  Left Lower Extremity   Hip Flexion: 5/5   Quadriceps:  5/5   Ankle Dorsiflexion:  5/5   Anterior tibial:  5/5   EHL:  5/5    Reflexes     Left Side  Biceps:  2+  Brachioradialis:  2+  Achilles:  2+    Right Side   Biceps:  2+  Brachioradialis:  2+  Achilles:  2+    Vascular Exam     Right Pulses        Carotid:                  2+    Left  Pulses        Carotid:                  2+          Assessment:     1. Sciatica of left side    2. Dorsalgia, unspecified    3. Spondylosis without myelopathy or radiculopathy, lumbosacral region    4. DDD (degenerative disc disease), lumbar    5. Strain of lumbar region, initial encounter    6. Lumbar radiculopathy, chronic           Plan:        Prior records reviewed today  Order lumbar MRI to evaluate persistent numbness down left lower extremity  Continue PT  Rx Robaxin 500 mg as needed for muscle pain.  Taking as needed  We discussed posture sitting and the importance of trying to sit better.    We discussed the benefits of therapy and exercise and continuing to move.   Virtual follow-up after imaging      Follow-up: No follow-ups on file. If there are any questions prior to this, the patient was instructed to contact the office.

## 2024-03-15 LAB
C TRACH DNA SPEC QL NAA+PROBE: NOT DETECTED
N GONORRHOEA DNA SPEC QL NAA+PROBE: NOT DETECTED

## 2024-03-25 ENCOUNTER — PATIENT MESSAGE (OUTPATIENT)
Dept: SPINE | Facility: CLINIC | Age: 24
End: 2024-03-25
Payer: COMMERCIAL

## 2024-10-22 ENCOUNTER — OFFICE VISIT (OUTPATIENT)
Dept: URGENT CARE | Facility: CLINIC | Age: 24
End: 2024-10-22
Payer: COMMERCIAL

## 2024-10-22 VITALS
BODY MASS INDEX: 26.84 KG/M2 | HEIGHT: 67 IN | RESPIRATION RATE: 18 BRPM | WEIGHT: 171 LBS | OXYGEN SATURATION: 99 % | DIASTOLIC BLOOD PRESSURE: 89 MMHG | HEART RATE: 80 BPM | SYSTOLIC BLOOD PRESSURE: 143 MMHG | TEMPERATURE: 98 F

## 2024-10-22 DIAGNOSIS — J02.9 SORE THROAT: ICD-10-CM

## 2024-10-22 DIAGNOSIS — J06.9 VIRAL URI WITH COUGH: Primary | ICD-10-CM

## 2024-10-22 DIAGNOSIS — R05.1 ACUTE COUGH: ICD-10-CM

## 2024-10-22 LAB
CTP QC/QA: YES
CTP QC/QA: YES
MOLECULAR STREP A: NEGATIVE
SARS-COV-2 AG RESP QL IA.RAPID: NEGATIVE

## 2024-10-22 PROCEDURE — 87811 SARS-COV-2 COVID19 W/OPTIC: CPT | Mod: QW,S$GLB,, | Performed by: NURSE PRACTITIONER

## 2024-10-22 PROCEDURE — 99213 OFFICE O/P EST LOW 20 MIN: CPT | Mod: S$GLB,,, | Performed by: NURSE PRACTITIONER

## 2024-10-22 PROCEDURE — 87651 STREP A DNA AMP PROBE: CPT | Mod: QW,S$GLB,, | Performed by: NURSE PRACTITIONER

## 2024-10-22 RX ORDER — LORATADINE 10 MG/1
10 TABLET ORAL DAILY
Qty: 7 TABLET | Refills: 0 | Status: SHIPPED | OUTPATIENT
Start: 2024-10-22 | End: 2024-10-29

## 2024-10-22 RX ORDER — BENZONATATE 200 MG/1
200 CAPSULE ORAL 3 TIMES DAILY PRN
Qty: 30 CAPSULE | Refills: 0 | Status: SHIPPED | OUTPATIENT
Start: 2024-10-22 | End: 2024-11-01

## 2024-10-22 RX ORDER — PREDNISONE 20 MG/1
20 TABLET ORAL DAILY
Qty: 3 TABLET | Refills: 0 | Status: SHIPPED | OUTPATIENT
Start: 2024-10-22 | End: 2024-10-25

## 2024-10-22 NOTE — PROGRESS NOTES
"Subjective:      Patient ID: Rajeev Rodgers is a 24 y.o. female.    Vitals:  height is 5' 7" (1.702 m) and weight is 77.6 kg (171 lb). Her oral temperature is 98.4 °F (36.9 °C). Her blood pressure is 143/89 (abnormal) and her pulse is 80. Her respiration is 18 and oxygen saturation is 99%.     Chief Complaint: Sore Throat (Entered by patient)    Pt is here with c/o of a constant cough and congestion that's gradually strengthening. She expresses she had chills and sweats lastnight.  Provider note begins below    Patient reports sudden onset of symptoms.  No fevers.  No known ill contacts.  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  Denies fevers.  Reports fatigue.  Reports feeling like her throat is bloody.    Sore Throat   This is a new problem. Episode onset: 1 day. Associated symptoms include coughing. Pertinent negatives include no diarrhea, shortness of breath or vomiting. Treatments tried: alkestzer, mucinex.       Constitution: Positive for chills and fatigue. Negative for sweating and fever.   HENT:  Positive for sore throat.    Cardiovascular:  Negative for chest pain and sob on exertion.   Respiratory:  Positive for cough and sputum production. Negative for shortness of breath, wheezing and asthma.    Gastrointestinal:  Negative for nausea, vomiting, constipation and diarrhea.   Allergic/Immunologic: Negative for asthma.      Objective:     Physical Exam   Constitutional: She is oriented to person, place, and time.   HENT:   Head: Normocephalic and atraumatic.   Nose: Rhinorrhea and congestion present.   Mouth/Throat: Posterior oropharyngeal erythema present. No oropharyngeal exudate.   Cardiovascular: Normal rate.   Pulmonary/Chest: Effort normal. No respiratory distress.   Abdominal: Normal appearance.   Lymphadenopathy:     She has cervical adenopathy.   Neurological: She is alert and oriented to person, place, and time.   Skin: Skin is warm and dry.   Psychiatric: Her behavior is normal. Mood normal.         Results " for orders placed or performed in visit on 10/22/24   POCT Strep A, Molecular    Collection Time: 10/22/24 10:28 AM   Result Value Ref Range    Molecular Strep A, POC Negative Negative     Acceptable Yes    SARS Coronavirus 2 Antigen, POCT Manual Read    Collection Time: 10/22/24 10:31 AM   Result Value Ref Range    SARS Coronavirus 2 Antigen Negative Negative     Acceptable Yes       Assessment:     1. Viral URI with cough    2. Acute cough    3. Sore throat        Plan:   COVID test negative  Strep test negative  Claritin, Tessalon Perles, magic mouthwash, and short course of steroids   Follow-up if not improving  Retest for COVID if your symptoms are persisting              Viral URI with cough  -     (Magic mouthwash) 1:1:1 diphenhydrAMINE(Benadryl) 12.5mg/5ml liq, aluminum & magnesium hydroxide-simethicone (Maalox), LIDOcaine viscous 2%; Swish and spit 10 mLs every 4 (four) hours as needed (sore throat).  Dispense: 200 mL; Refill: 0  -     predniSONE (DELTASONE) 20 MG tablet; Take 1 tablet (20 mg total) by mouth once daily. for 3 days  Dispense: 3 tablet; Refill: 0  -     loratadine (CLARITIN) 10 mg tablet; Take 1 tablet (10 mg total) by mouth once daily. for 7 days  Dispense: 7 tablet; Refill: 0  -     benzonatate (TESSALON) 200 MG capsule; Take 1 capsule (200 mg total) by mouth 3 (three) times daily as needed for Cough.  Dispense: 30 capsule; Refill: 0    Acute cough  -     SARS Coronavirus 2 Antigen, POCT Manual Read  -     POCT Strep A, Molecular    Sore throat  -     SARS Coronavirus 2 Antigen, POCT Manual Read  -     POCT Strep A, Molecular

## 2025-01-24 ENCOUNTER — OFFICE VISIT (OUTPATIENT)
Dept: URGENT CARE | Facility: CLINIC | Age: 25
End: 2025-01-24
Payer: COMMERCIAL

## 2025-01-24 VITALS
OXYGEN SATURATION: 99 % | HEIGHT: 67 IN | SYSTOLIC BLOOD PRESSURE: 133 MMHG | HEART RATE: 64 BPM | RESPIRATION RATE: 18 BRPM | DIASTOLIC BLOOD PRESSURE: 89 MMHG | BODY MASS INDEX: 26.68 KG/M2 | WEIGHT: 170 LBS | TEMPERATURE: 98 F

## 2025-01-24 DIAGNOSIS — J30.9 ALLERGIC RHINITIS, UNSPECIFIED SEASONALITY, UNSPECIFIED TRIGGER: ICD-10-CM

## 2025-01-24 DIAGNOSIS — Z87.09 HISTORY OF INFLUENZA: ICD-10-CM

## 2025-01-24 DIAGNOSIS — J02.9 PHARYNGITIS, UNSPECIFIED ETIOLOGY: Primary | ICD-10-CM

## 2025-01-24 LAB
CTP QC/QA: YES
MOLECULAR STREP A: NEGATIVE

## 2025-01-24 PROCEDURE — 87651 STREP A DNA AMP PROBE: CPT | Mod: QW,S$GLB,, | Performed by: FAMILY MEDICINE

## 2025-01-24 PROCEDURE — 99214 OFFICE O/P EST MOD 30 MIN: CPT | Mod: S$GLB,,, | Performed by: FAMILY MEDICINE

## 2025-01-24 PROCEDURE — 87591 N.GONORRHOEAE DNA AMP PROB: CPT | Performed by: FAMILY MEDICINE

## 2025-01-24 RX ORDER — ONDANSETRON 4 MG/1
8 TABLET, FILM COATED ORAL 2 TIMES DAILY
COMMUNITY

## 2025-01-24 NOTE — PATIENT INSTRUCTIONS
RESULTS OF TESTING DONE TODAY WE WILL BE AVAILABLE ON THE PATIENT PORTAL.    KEEP YOUR DIET SOFT  UNTIL YOUR THROAT SYMPTOMS IMPROVE.    CONTINUE ZYRTEC DAILY AS NEEDED.     TRY USING FLONASE, 2 INHALATIONS EACH NOSTRIL 1-2 TIMES DAILY, AND ON A REGULAR BASIS DURING DIFFICULT TIMES TO ADDRESS YOUR ALLERGY SYMPTOMS.       Make sure that you follow up with your primary care doctor in the next 2-5 days if needed .  Return to or go to Urgent Care if signs or symptoms change and certainly if you have worsening symptoms go to the nearest emergency department for further evaluation.

## 2025-01-24 NOTE — PROGRESS NOTES
"Subjective:      Patient ID: Rajeev Rodgers is a 24 y.o. female.    Vitals:  height is 5' 7" (1.702 m) and weight is 77.1 kg (170 lb). Her oral temperature is 98.3 °F (36.8 °C). Her blood pressure is 133/89 and her pulse is 64. Her respiration is 18 and oxygen saturation is 99%.     Chief Complaint: Sinus Problem    Patient presents with symptoms for ten days. In the beginning of her illness, rajeev reports chills, fever and body aches. She also had a cough. All sxs resolved with the exception of irritated throat. No fever in the last 72 hours. Patient states she started to google her sxs and she wondered if her throat pain could be related to gc/ct of the throat. Pateint reports being sexually active with one male partner.  She presents primarily for testing.  She also states she experiences throat pain when she swallows food, although able to drink water without difficulty.  Reports history consistent with allergic rhinitis.  Previously on daily Zyrtec, although not in the past 4 months.  Using Flonase without consistency        Sinus Problem  This is a new problem. Episode onset: 10 days. The problem is unchanged. There has been no fever. Associated symptoms include congestion, coughing, headaches and sinus pressure. Past treatments include nothing.       HENT:  Positive for congestion and sinus pressure.    Respiratory:  Positive for cough.    Neurological:  Positive for headaches.      Objective:     Physical Exam   Constitutional: She is oriented to person, place, and time. She appears well-developed.  Non-toxic appearance. She does not appear ill. No distress.   HENT:   Head: Normocephalic and atraumatic.   Ears:   Right Ear: Tympanic membrane and external ear normal.   Left Ear: Tympanic membrane and external ear normal.   Mouth/Throat: No oropharyngeal exudate or posterior oropharyngeal erythema.      Comments: Posterior pharynx with cobblestoning, otherwise negative  Cardiovascular: Normal rate and regular " rhythm.   Pulmonary/Chest: Effort normal. No stridor. No respiratory distress. She has no wheezes. She has no rhonchi. She has no rales.   Abdominal: Normal appearance.   Lymphadenopathy:     She has no cervical adenopathy.   Neurological: She is alert and oriented to person, place, and time.   Skin: Skin is not diaphoretic.   Psychiatric: Her behavior is normal. Thought content normal.   Nursing note and vitals reviewed.    Results for orders placed or performed in visit on 01/24/25   POCT Strep A, Molecular    Collection Time: 01/24/25  1:56 PM   Result Value Ref Range    Molecular Strep A, POC Negative Negative     Acceptable Yes         Assessment:     1. Pharyngitis, unspecified etiology    2. History of influenza    3. Allergic rhinitis, unspecified seasonality, unspecified trigger      --  we discussed this likely started as the flu which resolved.  Plan:       Pharyngitis, unspecified etiology  -     POCT Strep A, Molecular  -     C.trach/N.gonor AMP RNA    History of influenza    Allergic rhinitis, unspecified seasonality, unspecified trigger        RESULTS OF TESTING DONE TODAY WE WILL BE AVAILABLE ON THE PATIENT PORTAL.    KEEP YOUR DIET SOFT UNTIL YOUR THROAT SYMPTOMS IMPROVE.    RESUME ZYRTEC DAILY AS NEEDED.     TRY USING FLONASE, 2 INHALATIONS EACH NOSTRIL 1-2 TIMES DAILY, AND ON A REGULAR BASIS DURING DIFFICULT TIMES TO ADDRESS YOUR ALLERGY SYMPTOMS.       Make sure that you follow up with your primary care doctor in the next 2-5 days if needed .  Return to or go to Urgent Care if signs or symptoms change and certainly if you have worsening symptoms go to the nearest emergency department for further evaluation.

## 2025-01-25 ENCOUNTER — TELEPHONE (OUTPATIENT)
Dept: URGENT CARE | Facility: CLINIC | Age: 25
End: 2025-01-25
Payer: COMMERCIAL

## 2025-01-25 NOTE — TELEPHONE ENCOUNTER
Called For follow-up and negative throat tests.  Patient is feeling much better.  Advised to follow-up with PCP. Answered all questions.

## 2025-01-27 ENCOUNTER — PATIENT MESSAGE (OUTPATIENT)
Dept: URGENT CARE | Facility: CLINIC | Age: 25
End: 2025-01-27
Payer: COMMERCIAL

## 2025-01-27 LAB
C TRACH RRNA SPEC QL NAA+PROBE: NEGATIVE
N GONORRHOEA RRNA SPEC QL NAA+PROBE: NEGATIVE
N.GONORROHEAE, AMP RNA SOURCE: NORMAL
SPECIMEN SOURCE: NORMAL

## 2025-03-18 ENCOUNTER — PATIENT MESSAGE (OUTPATIENT)
Dept: ADMINISTRATIVE | Facility: HOSPITAL | Age: 25
End: 2025-03-18
Payer: COMMERCIAL

## 2025-03-19 ENCOUNTER — PATIENT OUTREACH (OUTPATIENT)
Dept: ADMINISTRATIVE | Facility: HOSPITAL | Age: 25
End: 2025-03-19
Payer: COMMERCIAL

## 2025-03-19 DIAGNOSIS — Z12.4 CERVICAL CANCER SCREENING: Primary | ICD-10-CM

## 2025-03-24 ENCOUNTER — TELEPHONE (OUTPATIENT)
Dept: OBSTETRICS AND GYNECOLOGY | Facility: CLINIC | Age: 25
End: 2025-03-24
Payer: COMMERCIAL

## 2025-03-24 PROBLEM — F90.9 ADHD: Status: ACTIVE | Noted: 2025-03-24

## 2025-03-24 NOTE — TELEPHONE ENCOUNTER
----- Message from José Grove sent at 3/24/2025 10:09 AM CDT -----  Name of Who is Calling: JOSÉ YUAN [[id number]]      What is the request in detail: Pt is calling to see if she can have her IUD removed and replace on the same day of her appt. Pt has an appt in the 25th of this month. Please contact the pt for further information      Can the clinic reply by [facility]: No      What Number to [facility] Back [facility]: [phone]

## 2025-03-24 NOTE — TELEPHONE ENCOUNTER
Returned pt call in regards to an appointment. Pt verbalized understanding and had no further questions at this time.

## 2025-03-24 NOTE — PROGRESS NOTES
FAMILY MEDICINE  [facility]    Reason for visit:   Chief Complaint   Patient presents with    Annual Exam         SUBJECTIVE: Rajeev Rodgers is a 24 y.o. female  - with depression and ADHD presents for routine annual physical    Gynecology Mike Cervantes, NP  Psychiatry Dr. Cindi Siddiqi MD  Back and Spine Kaylee Miller NP    Rajeev Rodgers reports overall that she is doing well.  Unfortunately, she was laid off of her job [employer] recently.  However she is currently other forms of the appointment.  She also plans to trip to your 04/2025 for 1 month.  She is hopeful.  She exercises regularly.    Last year she is having a lot of issues with her low back.  She [facility].  She completed physical therapy and reports that she is doing much better at about 95% improved.  She still does her exercises regularly.    She reports that she is scheduled for an appointment with her gynecologist 03/27/2025 for Pap smear and assessment to replace her IUD.          Review of Systems   All other systems reviewed and are negative.      HEALTH MAINTENANCE:   Health Maintenance   Topic Date Due    HPV Vaccines (1 - 3-dose series) Never done    Influenza Vaccine (1) 09/01/2024    COVID-19 Vaccine (5 - 2024-25 season) 09/01/2024    Pap Smear  05/24/2025    TETANUS VACCINE  07/23/2031    RSV Vaccine (Age 60+ and Pregnant patients) (1 - 1-dose 75+ series) 08/23/2074    Hepatitis C Screening  Completed    HIV Screening  Completed    Lipid Panel  Completed    Pneumococcal Vaccines (Age 0-49)  Aged Out     Health Maintenance Topics with due status: Not Due       Topic Last Completion Date    TETANUS VACCINE 07/23/2021    RSV Vaccine (Age 60+ and Pregnant patients) Not Due     Health Maintenance Due   Topic Date Due    HPV Vaccines (1 - 3-dose series) Never done    Influenza Vaccine (1) 09/01/2024    COVID-19 Vaccine (5 - 2024-25 season) 09/01/2024    Pap Smear  05/24/2025       HISTORY:  "  Past Medical History:   Diagnosis Date    ADHD     Depression     Eating disorder     Binge eating 10 years    Sciatica of left side 01/22/2024    - with signs of neurological claudication   2/1/24 Xray lumbar spine: Grade 1 spondylolisthesis at L5-S1 without evidence of spondylolysis   - followed by [facility] clinic  - improving  - continue with PT         Past Surgical History:   Procedure Laterality Date    INTRAUTERINE DEVICE INSERTION  2020    KYLEENA       Family History   Problem Relation Name Age of Onset    Depression Mother      Diabetes Father      Hypertension Father      Chronic back pain Father      Neuropathy Father      No Known Problems Sister      No Known Problems Maternal Grandmother      No Known Problems Maternal Grandfather      No Known Problems Paternal Grandmother      No Known Problems Paternal Grandfather      Breast cancer Neg Hx      Colon cancer Neg Hx      Ovarian cancer Neg Hx         Social History[1]    Social History     Social History Narrative    Single. Public health major. [facility] off from [employer] on [id number]. Exercises regularly       ALLERGIES:   Review of patient's allergies indicates:  No Known Allergies    MEDS:   Current Outpatient Medications on File Prior to Visit   Medication Sig Dispense Refill Last Dose/Taking    dextroamphetamine-amphetamine (ADDERALL XR) 30 MG 24 hr capsule Take 1 capsule (30 mg total) by mouth every morning. 30 capsule 0 Taking    ondansetron (ZOFRAN) 4 MG tablet Take 8 mg by mouth 2 (two) times daily.   Taking         Vital signs:   Vitals:    03/26/25 1040   BP: 122/74   BP Location: Left arm   Patient Position: Sitting   Pulse: 83   Resp: 18   SpO2: 99%   Weight: 81.4 kg (179 lb 7.3 oz)   Height: 5' 7" (1.702 m)     Body mass index is 28.11 kg/m².    PHYSICAL EXAM:     Physical Exam  Vitals reviewed.   Constitutional:       General: She is not in acute distress.  HENT:      Head: Normocephalic and atraumatic.      Right " Ear: Tympanic membrane and ear canal normal.      Left Ear: Tympanic membrane and ear canal normal.   Eyes:      General: No scleral icterus.     Conjunctiva/sclera: Conjunctivae normal.   Neck:      Thyroid: No thyromegaly.      Vascular: No carotid bruit.   Cardiovascular:      Rate and Rhythm: Normal rate and regular rhythm.      Pulses: Normal pulses.      Heart sounds: Normal heart sounds. No murmur heard.     No friction rub. No gallop.   Pulmonary:      Effort: Pulmonary effort is normal.      Breath sounds: Normal breath sounds. No wheezing, rhonchi or rales.   Abdominal:      General: Bowel sounds are normal. There is no distension.      Palpations: Abdomen is soft.      Tenderness: There is no abdominal tenderness.   Musculoskeletal:      Cervical back: Normal range of motion and neck supple.      Right lower leg: No edema.      Left lower leg: No edema.   Lymphadenopathy:      Cervical: No cervical adenopathy.   Skin:     General: Skin is warm.      Capillary Refill: Capillary refill takes less than 2 seconds.   Neurological:      Mental Status: She is alert.             PERTINENT RESULTS:   No visits with results within 1 Week(s) from this visit.   Latest known visit with results is:   Office Visit on 01/24/2025   Component Date Value Ref Range Status    Molecular Strep A, POC 01/24/2025 Negative  Negative Final     Acceptable 01/24/2025 Yes   Final    EZEQUIEL mahmood RNA Source 01/24/2025 Throat   Final    C.trach. Misc. Amp RNA 01/24/2025 Negative  Negative Final    Comment: -------------------ADDITIONAL INFORMATION-------------------  This report is intended for use in clinical monitoring   and management of patients. It is not intended for use   in medical-legal applications.       Jazmin RNA Source 01/24/2025 Throat   Final    Rolando Wu. Amp RNA 01/24/2025 Negative  Negative Final    Comment: -------------------ADDITIONAL INFORMATION-------------------  This report is  intended for use in clinical monitoring   and management of patients. It is not intended for use   in medical-legal applications.     Test Performed by:  [facility]  [address] [place] [address]  : Jenn Franklin Ph.D.; CLIA# [id number]       Lab Results   Component Value Date    WBC 3.60 (L) 07/29/2022    HGB 15.2 07/29/2022    HCT 44.3 07/29/2022    MCV 90 07/29/2022     07/29/2022       CMP  Sodium   Date Value Ref Range Status   07/29/2022 138 136 - 145 mmol/L Final     Potassium   Date Value Ref Range Status   07/29/2022 4.1 3.5 - 5.1 mmol/L Final     Chloride   Date Value Ref Range Status   07/29/2022 102 95 - 110 mmol/L Final     CO2   Date Value Ref Range Status   07/29/2022 27 23 - 29 mmol/L Final     Glucose   Date Value Ref Range Status   07/29/2022 84 70 - 110 mg/dL Final     BUN   Date Value Ref Range Status   07/29/2022 10 6 - 20 mg/dL Final     Creatinine   Date Value Ref Range Status   07/29/2022 0.8 0.5 - 1.4 mg/dL Final     Calcium   Date Value Ref Range Status   07/29/2022 10.6 (H) 8.7 - 10.5 mg/dL Final     Total Protein   Date Value Ref Range Status   07/29/2022 7.8 6.0 - 8.4 g/dL Final     Albumin   Date Value Ref Range Status   07/29/2022 4.9 3.5 - 5.2 g/dL Final     Total Bilirubin   Date Value Ref Range Status   07/29/2022 0.6 0.1 - 1.0 mg/dL Final     Comment:     For infants and newborns, interpretation of results should be based  on gestational age, weight and in agreement with clinical  observations.    Premature Infant recommended reference ranges:  Up to 24 hours.............<8.0 mg/dL  Up to 48 hours............<12.0 mg/dL  3-5 days..................<15.0 mg/dL  6-29 days.................<15.0 mg/dL       Alkaline Phosphatase   Date Value Ref Range Status   07/29/2022 62 55 - 135 U/L Final     AST   Date Value Ref Range Status   07/29/2022 17 10 - 40 U/L Final     ALT   Date Value Ref Range Status    07/29/2022 18 10 - 44 U/L Final     Anion Gap   Date Value Ref Range Status   07/29/2022 9 8 - 16 mmol/L Final     Lab Results   Component Value Date    CHOL 148 07/29/2022     Lab Results   Component Value Date    HDL 48 07/29/2022     Lab Results   Component Value Date    LDLCALC 87.8 07/29/2022     Lab Results   Component Value Date    TRIG 61 07/29/2022       Lab Results   Component Value Date    CHOLHDL 32.4 07/29/2022       ASSESSMENT/PLAN:    1. Routine medical exam  Overview:  - preventative health counseling  - reviewed current recommendations for breast cancer screening. Average risk Reginaldock: No Score  - reviewed current recommendations for cervical cancer screening. 5/2022 PAP negative. Repeat due 5/2025 this year scheduled for 3/27/25  - reviewed current recommendations for colon cancer screening. Average risk      Orders:  -     TSH; Future; Expected date: 03/26/2025  -     Lipid Panel; Future; Expected date: 03/26/2025  -     Hemoglobin A1C; Future; Expected date: 03/26/2025  -     Comprehensive Metabolic Panel; Future; Expected date: 03/26/2025  -     CBC Auto Differential; Future; Expected date: 03/26/2025  -     HIV 1/2 Ag/Ab (4th Gen); Future; Expected date: 03/26/2025  -     C. trachomatis/N. gonorrhoeae by AMP DNA [facility]; Urine; Future; Expected date: 03/26/2025    2. Screening for HIV (human immunodeficiency virus)  -     HIV 1/2 Ag/Ab (4th Gen); Future; Expected date: 03/26/2025    3. Screen for STD (sexually transmitted disease)  -     C. trachomatis/N. gonorrhoeae by AMP DNA [facility]; Urine; Future; Expected date: 03/26/2025          ORDERS:   Orders Placed This Encounter    TSH    Lipid Panel    Hemoglobin A1C    Comprehensive Metabolic Panel    CBC Auto Differential    HIV 1/2 Ag/Ab (4th Gen)    C. trachomatis/N. gonorrhoeae by AMP DNA [facility]; Urine       Vaccines recommended: flu, covid-19.  She reports that she completed her HPV series in high school     Follow up in about 1 year  (around 3/26/2026) for Annual. or sooner with any concerns      This encounter was dictated and transcribed using DeepScribe and [employer], please excuse any typographical or grammatical errors.    Dr. Belinda Ruiz D.O.   Family Medicine         [1]   Social History  Tobacco Use    Smoking status: Never    Smokeless tobacco: Never   Substance Use Topics    Alcohol use: Yes    Drug use: Not Currently     Types: Marijuana

## 2025-03-26 ENCOUNTER — OFFICE VISIT (OUTPATIENT)
Dept: PRIMARY CARE CLINIC | Facility: CLINIC | Age: 25
End: 2025-03-26
Attending: FAMILY MEDICINE
Payer: COMMERCIAL

## 2025-03-26 VITALS
DIASTOLIC BLOOD PRESSURE: 74 MMHG | SYSTOLIC BLOOD PRESSURE: 122 MMHG | OXYGEN SATURATION: 99 % | HEIGHT: 67 IN | WEIGHT: 179.44 LBS | RESPIRATION RATE: 18 BRPM | BODY MASS INDEX: 28.16 KG/M2 | HEART RATE: 83 BPM

## 2025-03-26 DIAGNOSIS — Z00.00 ROUTINE MEDICAL EXAM: Primary | ICD-10-CM

## 2025-03-26 DIAGNOSIS — Z11.3 SCREEN FOR STD (SEXUALLY TRANSMITTED DISEASE): ICD-10-CM

## 2025-03-26 DIAGNOSIS — Z11.4 SCREENING FOR HIV (HUMAN IMMUNODEFICIENCY VIRUS): ICD-10-CM

## 2025-03-26 PROBLEM — M54.32 SCIATICA OF LEFT SIDE: Status: RESOLVED | Noted: 2024-01-22 | Resolved: 2025-03-26

## 2025-03-26 PROCEDURE — 3008F BODY MASS INDEX DOCD: CPT | Mod: CPTII,S$GLB,, | Performed by: FAMILY MEDICINE

## 2025-03-26 PROCEDURE — 3078F DIAST BP <80 MM HG: CPT | Mod: CPTII,S$GLB,, | Performed by: FAMILY MEDICINE

## 2025-03-26 PROCEDURE — 99395 PREV VISIT EST AGE 18-39: CPT | Mod: S$GLB,,, | Performed by: FAMILY MEDICINE

## 2025-03-26 PROCEDURE — 1160F RVW MEDS BY RX/DR IN RCRD: CPT | Mod: CPTII,S$GLB,, | Performed by: FAMILY MEDICINE

## 2025-03-26 PROCEDURE — 99999 PR PBB SHADOW E&M-EST. PATIENT-LVL IV: CPT | Mod: PBBFAC,,, | Performed by: FAMILY MEDICINE

## 2025-03-26 PROCEDURE — 1159F MED LIST DOCD IN RCRD: CPT | Mod: CPTII,S$GLB,, | Performed by: FAMILY MEDICINE

## 2025-03-26 PROCEDURE — 3074F SYST BP LT 130 MM HG: CPT | Mod: CPTII,S$GLB,, | Performed by: FAMILY MEDICINE

## 2025-03-27 ENCOUNTER — RESULTS FOLLOW-UP (OUTPATIENT)
Dept: PRIMARY CARE CLINIC | Facility: CLINIC | Age: 25
End: 2025-03-27

## 2025-03-27 ENCOUNTER — OFFICE VISIT (OUTPATIENT)
Dept: OBSTETRICS AND GYNECOLOGY | Facility: CLINIC | Age: 25
End: 2025-03-27
Attending: FAMILY MEDICINE
Payer: COMMERCIAL

## 2025-03-27 ENCOUNTER — LAB VISIT (OUTPATIENT)
Dept: LAB | Facility: OTHER | Age: 25
End: 2025-03-27
Attending: FAMILY MEDICINE
Payer: COMMERCIAL

## 2025-03-27 VITALS
BODY MASS INDEX: 28.56 KG/M2 | WEIGHT: 182.31 LBS | SYSTOLIC BLOOD PRESSURE: 130 MMHG | DIASTOLIC BLOOD PRESSURE: 85 MMHG

## 2025-03-27 DIAGNOSIS — Z01.419 WELL WOMAN EXAM WITH ROUTINE GYNECOLOGICAL EXAM: Primary | ICD-10-CM

## 2025-03-27 DIAGNOSIS — Z12.4 PAP SMEAR FOR CERVICAL CANCER SCREENING: ICD-10-CM

## 2025-03-27 DIAGNOSIS — Z11.3 SCREEN FOR STD (SEXUALLY TRANSMITTED DISEASE): ICD-10-CM

## 2025-03-27 DIAGNOSIS — Z11.3 ENCOUNTER FOR SCREENING FOR INFECTIONS WITH A PREDOMINANTLY SEXUAL MODE OF TRANSMISSION: ICD-10-CM

## 2025-03-27 DIAGNOSIS — Z00.00 ROUTINE MEDICAL EXAM: ICD-10-CM

## 2025-03-27 DIAGNOSIS — Z30.016 ENCOUNTER FOR INITIAL PRESCRIPTION OF TRANSDERMAL PATCH HORMONAL CONTRACEPTIVE DEVICE: ICD-10-CM

## 2025-03-27 DIAGNOSIS — T83.32XA INTRAUTERINE CONTRACEPTIVE DEVICE THREADS LOST, INITIAL ENCOUNTER: ICD-10-CM

## 2025-03-27 DIAGNOSIS — Z30.09 BIRTH CONTROL COUNSELING: ICD-10-CM

## 2025-03-27 PROBLEM — Z97.5 IUD (INTRAUTERINE DEVICE) IN PLACE: Status: ACTIVE | Noted: 2025-03-27

## 2025-03-27 PROCEDURE — 99999 PR PBB SHADOW E&M-EST. PATIENT-LVL III: CPT | Mod: PBBFAC,,, | Performed by: NURSE PRACTITIONER

## 2025-03-27 PROCEDURE — 87591 N.GONORRHOEAE DNA AMP PROB: CPT

## 2025-03-27 RX ORDER — NORELGESTROMIN AND ETHINYL ESTRADIOL 35; 150 UG/MG; UG/MG
1 PATCH TRANSDERMAL WEEKLY
Qty: 4 PATCH | Refills: 11 | Status: SHIPPED | OUTPATIENT
Start: 2025-03-27 | End: 2026-03-27

## 2025-03-27 NOTE — PROGRESS NOTES
CC: Annual  HPI: Pt is a 24 y.o.  female who presents for routine annual exam. New pt, here to establish care. Losing her health insurance on , trying to get everything done she can now. Plan is to go on her parents insurance at that time. Leaving for [place] on , will be there one month then moving home to [place]. Saw pcp, had labs done today including HIV and GC. She uses Kyleena for contraception. She does want STD screening.     Spotting starting with IUD, it expires this month. Desires a new one but had a traumatic experience with the insertion. Also knows may be difficult to get it approved by insurance and inserted before she leaves. In the past strings have been short but has had an ultrasound to verify position. [place]ena [place]. Not currently SA. Open to try the birth control patch right now.     FH:   Breast cancer: none  Colon cancer: none  Ovarian cancer: none  Uterine cancer: none    HPV vaccine:  Last pap smear: none on file  History of abnormal pap smears: no    Colonoscopy: na  DEXA: na  Mammogram:na  STD history: no  Birth control:  Kyleena  OB history: G0  Tobacco use: no    ROS:  GENERAL: Feeling well overall. Denies fever or chills.   SKIN: Denies rash or lesions.   HEAD: Denies head injury or headache.   NODES: Denies enlarged lymph nodes.   CHEST: Denies chest pain or shortness of breath.   CARDIOVASCULAR: Denies palpitations or left sided chest pain.   ABDOMEN: No abdominal pain, constipation, diarrhea, nausea, vomiting or rectal bleeding.   URINARY: No dysuria, hematuria, or burning on urination.  REPRODUCTIVE: See HPI.   BREASTS: Denies pain, lumps, or nipple discharge.   HEMATOLOGIC: No easy bruisability or excessive bleeding.   MUSCULOSKELETAL: Denies joint pain or swelling.   NEUROLOGIC: Denies syncope or weakness.   PSYCHIATRIC: Denies depression, anxiety or mood swings.    PE:   APPEARANCE: Well nourished, well developed, White female  in no acute distress. Nervous with pelvic exam.   NODES: no cervical, supraclavicular, or inguinal lymphadenopathy  BREASTS: Symmetrical, no skin changes or visible lesions. No palpable masses, nipple discharge or adenopathy bilaterally.  ABDOMEN: Soft. No tenderness or masses. No distention. No hernias palpated. No CVA tenderness.  VULVA: No lesions. Normal external female genitalia.  URETHRAL MEATUS: Normal size and location, no lesions, no prolapse.  URETHRA: No masses, tenderness, or prolapse.  VAGINA: Moist. No lesions or lacerations noted. No abnormal discharge present. No odor present.   CERVIX: No lesions or discharge. No cervical motion tenderness. Strings not seen.   UTERUS: Normal size, regular shape, mobile, non-tender.  ADNEXA: No tenderness. No fullness or masses palpated in the adnexal regions.   ANUS PERINEUM: Normal.      Diagnosis:  1. Well woman exam with routine gynecological exam    2. Pap smear for cervical cancer screening    3. Intrauterine contraceptive device threads lost, initial encounter    4. Encounter for screening for infections with a predominantly sexual mode of transmission    5. Birth control counseling    6. Encounter for initial prescription of transdermal patch hormonal contraceptive device        Plan:     Orders Placed This Encounter    Vaginosis Screen by DNA Probe    Liquid-Based Pap Smear, Screening    norelgestromin-ethinyl estradiol (XULANE) 150-35 mcg/24 hr     Declines hep and RPR today, vaginosis pending  Pap updated  IUD strings not seen. Offered pelvic US today to confirm placement- declines. Prefers to wait and handle everything in [place]. Aware IUD is not working for pregnancy prevention at this time.   Mammogram at age 39    Patient was counseled today on the new ACS guidelines for cervical cytology screening as well as the current recommendations for breast cancer screening. She was counseled to follow up with her PCP for other routine health maintenance.  Counseling session lasted approximately 15 minutes, and all her questions were answered.  For women over the age of 65, you can stop having cervical cancer screenings if you have never had abnormal cervical cells or cervical cancer, and youve had three negative Pap tests in a row. (You also can stop screening if youve had two negative Pap and HPV tests in a row in the past 10 years, with at least one test in the past 5 years.),    Follow-up with me in 1 year for routine exam; pap in 3 years.     I spent a total of 30 minutes on the day of the visit.This includes face to face time and non-face to face time preparing to see the patient (eg, review of tests), obtaining and/or reviewing separately obtained history, documenting clinical information in the electronic or other health record, independently interpreting results and communicating results to the patient/family/caregiver, or care coordinator.    As of April 1, 2021, the Cures Act has been passed nationally. This new law requires that all doctors progress notes, lab results, pathology reports and radiology reports be released IMMEDIATELY to the patient in the patient portal. That means that the results are released to you at the EXACT same time they are released to me. Therefore, with all of the patients that I have I am not able to reply to each patient exactly when the results come in. So there will be a delay from when you see the results to when I see them and have time to come up with a response to send you. Also I only see these results when I am on the computer at work. So if the results come in over the weekend or after 5 pm of a work day, I will not see them until the next business day. As you can tell, this is a challenge as a provider to give every patient the quick response they hope for and deserve. So please be patient!     Thanks for your understanding and patience.

## 2025-03-31 LAB
C TRACH DNA SPEC QL NAA+PROBE: NOT DETECTED
CTGC SOURCE (OHS) ORD-325: NORMAL
N GONORRHOEA DNA UR QL NAA+PROBE: NOT DETECTED

## 2025-04-01 ENCOUNTER — RESULTS FOLLOW-UP (OUTPATIENT)
Dept: OBSTETRICS AND GYNECOLOGY | Facility: CLINIC | Age: 25
End: 2025-04-01